# Patient Record
Sex: FEMALE | Race: OTHER | NOT HISPANIC OR LATINO | URBAN - METROPOLITAN AREA
[De-identification: names, ages, dates, MRNs, and addresses within clinical notes are randomized per-mention and may not be internally consistent; named-entity substitution may affect disease eponyms.]

---

## 2017-01-27 PROBLEM — Z00.00 ENCOUNTER FOR PREVENTIVE HEALTH EXAMINATION: Status: ACTIVE | Noted: 2017-01-27

## 2017-01-30 ENCOUNTER — OUTPATIENT (OUTPATIENT)
Dept: OUTPATIENT SERVICES | Facility: HOSPITAL | Age: 32
LOS: 1 days | End: 2017-01-30
Payer: COMMERCIAL

## 2017-01-30 DIAGNOSIS — Z00.00 ENCOUNTER FOR GENERAL ADULT MEDICAL EXAMINATION WITHOUT ABNORMAL FINDINGS: ICD-10-CM

## 2017-02-10 ENCOUNTER — APPOINTMENT (OUTPATIENT)
Dept: HEART AND VASCULAR | Facility: CLINIC | Age: 32
End: 2017-02-10

## 2017-02-10 ENCOUNTER — LABORATORY RESULT (OUTPATIENT)
Age: 32
End: 2017-02-10

## 2017-02-10 VITALS — SYSTOLIC BLOOD PRESSURE: 112 MMHG | DIASTOLIC BLOOD PRESSURE: 97 MMHG | HEART RATE: 109 BPM

## 2017-02-10 VITALS
DIASTOLIC BLOOD PRESSURE: 60 MMHG | SYSTOLIC BLOOD PRESSURE: 112 MMHG | BODY MASS INDEX: 27.46 KG/M2 | WEIGHT: 155 LBS | HEART RATE: 114 BPM | HEIGHT: 63 IN

## 2017-02-10 VITALS — SYSTOLIC BLOOD PRESSURE: 112 MMHG | DIASTOLIC BLOOD PRESSURE: 62 MMHG | HEART RATE: 98 BPM

## 2017-02-10 DIAGNOSIS — Z78.9 OTHER SPECIFIED HEALTH STATUS: ICD-10-CM

## 2017-02-10 DIAGNOSIS — R00.0 TACHYCARDIA, UNSPECIFIED: ICD-10-CM

## 2017-02-10 DIAGNOSIS — R00.2 PALPITATIONS: ICD-10-CM

## 2017-02-15 LAB
ALBUMIN SERPL ELPH-MCNC: 3.1 G/DL
ALP BLD-CCNC: 85 U/L
ALT SERPL-CCNC: 14 U/L
ANION GAP SERPL CALC-SCNC: 12 MMOL/L
AST SERPL-CCNC: 18 U/L
BASOPHILS # BLD AUTO: 0.02 K/UL
BASOPHILS NFR BLD AUTO: 0.2 %
BILIRUB SERPL-MCNC: 0.2 MG/DL
BUN SERPL-MCNC: 7 MG/DL
CALCIUM SERPL-MCNC: 9.5 MG/DL
CHLORIDE SERPL-SCNC: 107 MMOL/L
CO2 SERPL-SCNC: 21 MMOL/L
CREAT SERPL-MCNC: 0.5 MG/DL
EOSINOPHIL # BLD AUTO: 0.14 K/UL
EOSINOPHIL NFR BLD AUTO: 1.2 %
FERRITIN SERPL-MCNC: 8.2 NG/ML
GLUCOSE SERPL-MCNC: 95 MG/DL
HBA1C MFR BLD HPLC: 5.7 %
HCT VFR BLD CALC: 30.2 %
HGB BLD-MCNC: 9.8 G/DL
IMM GRANULOCYTES NFR BLD AUTO: 0.2 %
IRON SATN MFR SERPL: 10 %
IRON SERPL-MCNC: 50 UG/DL
LYMPHOCYTES # BLD AUTO: 1.29 K/UL
LYMPHOCYTES NFR BLD AUTO: 10.7 %
MAN DIFF?: NORMAL
MCHC RBC-ENTMCNC: 27.8 PG
MCHC RBC-ENTMCNC: 32.5 GM/DL
MCV RBC AUTO: 85.6 FL
MONOCYTES # BLD AUTO: 1.2 K/UL
MONOCYTES NFR BLD AUTO: 10 %
NEUTROPHILS # BLD AUTO: 9.36 K/UL
NEUTROPHILS NFR BLD AUTO: 77.7 %
PLATELET # BLD AUTO: 205 K/UL
POTASSIUM SERPL-SCNC: 4.1 MMOL/L
PROT SERPL-MCNC: 6 G/DL
RBC # BLD: 3.53 M/UL
RBC # BLD: 3.53 M/UL
RBC # FLD: 13.4 %
RETICS # AUTO: 1.4 %
RETICS AGGREG/RBC NFR: 50.8 K/UL
SODIUM SERPL-SCNC: 140 MMOL/L
T3FREE SERPL-MCNC: 2.52 PG/ML
T4 FREE SERPL-MCNC: 0.8 NG/DL
TIBC SERPL-MCNC: 490 UG/DL
TSH SERPL-ACNC: 1.69 UIU/ML
UIBC SERPL-MCNC: 440 UG/DL
WBC # FLD AUTO: 12.03 K/UL

## 2017-04-28 ENCOUNTER — OUTPATIENT (OUTPATIENT)
Dept: OUTPATIENT SERVICES | Facility: HOSPITAL | Age: 32
LOS: 1 days | End: 2017-04-28
Payer: COMMERCIAL

## 2017-04-28 LAB
ALBUMIN SERPL ELPH-MCNC: 2.6 G/DL — LOW (ref 3.4–5)
ALP SERPL-CCNC: 248 U/L — HIGH (ref 40–120)
ALT FLD-CCNC: 18 U/L — SIGNIFICANT CHANGE UP (ref 12–42)
ANION GAP SERPL CALC-SCNC: 13 MMOL/L — SIGNIFICANT CHANGE UP (ref 9–16)
APPEARANCE UR: CLEAR — SIGNIFICANT CHANGE UP
APTT BLD: 28.5 SEC — SIGNIFICANT CHANGE UP (ref 27.5–37.4)
AST SERPL-CCNC: 20 U/L — SIGNIFICANT CHANGE UP (ref 15–37)
BASOPHILS NFR BLD AUTO: 0.1 % — SIGNIFICANT CHANGE UP (ref 0–2)
BILIRUB SERPL-MCNC: 0.3 MG/DL — SIGNIFICANT CHANGE UP (ref 0.2–1.2)
BILIRUB UR-MCNC: NEGATIVE — SIGNIFICANT CHANGE UP
BUN SERPL-MCNC: 7 MG/DL — SIGNIFICANT CHANGE UP (ref 7–23)
CALCIUM SERPL-MCNC: 9.3 MG/DL — SIGNIFICANT CHANGE UP (ref 8.5–10.5)
CHLORIDE SERPL-SCNC: 105 MMOL/L — SIGNIFICANT CHANGE UP (ref 96–108)
CO2 SERPL-SCNC: 20 MMOL/L — LOW (ref 22–31)
COLOR SPEC: YELLOW — SIGNIFICANT CHANGE UP
CREAT SERPL-MCNC: 0.51 MG/DL — SIGNIFICANT CHANGE UP (ref 0.5–1.3)
DIFF PNL FLD: (no result)
EOSINOPHIL NFR BLD AUTO: 1.2 % — SIGNIFICANT CHANGE UP (ref 0–6)
FIBRINOGEN PPP-MCNC: 406 MG/DL — SIGNIFICANT CHANGE UP (ref 258–438)
GLUCOSE SERPL-MCNC: 76 MG/DL — SIGNIFICANT CHANGE UP (ref 70–99)
GLUCOSE UR QL: NEGATIVE — SIGNIFICANT CHANGE UP
HCT VFR BLD CALC: 33.5 % — LOW (ref 34.5–45)
HGB BLD-MCNC: 11 G/DL — LOW (ref 11.5–15.5)
INR BLD: 0.96 — SIGNIFICANT CHANGE UP (ref 0.88–1.16)
KETONES UR-MCNC: 40 MG/DL
LDH SERPL L TO P-CCNC: 197 U/L — SIGNIFICANT CHANGE UP (ref 84–246)
LEUKOCYTE ESTERASE UR-ACNC: NEGATIVE — SIGNIFICANT CHANGE UP
LYMPHOCYTES # BLD AUTO: 16.8 % — SIGNIFICANT CHANGE UP (ref 13–44)
MCHC RBC-ENTMCNC: 27 PG — SIGNIFICANT CHANGE UP (ref 27–34)
MCHC RBC-ENTMCNC: 32.8 G/DL — SIGNIFICANT CHANGE UP (ref 32–36)
MCV RBC AUTO: 82.1 FL — SIGNIFICANT CHANGE UP (ref 80–100)
MONOCYTES NFR BLD AUTO: 8.7 % — SIGNIFICANT CHANGE UP (ref 2–14)
NEUTROPHILS NFR BLD AUTO: 73.2 % — SIGNIFICANT CHANGE UP (ref 43–77)
NITRITE UR-MCNC: NEGATIVE — SIGNIFICANT CHANGE UP
PH UR: 6.5 — SIGNIFICANT CHANGE UP (ref 5–8)
PLATELET # BLD AUTO: 202 K/UL — SIGNIFICANT CHANGE UP (ref 150–400)
POTASSIUM SERPL-MCNC: 3.6 MMOL/L — SIGNIFICANT CHANGE UP (ref 3.5–5.3)
POTASSIUM SERPL-SCNC: 3.6 MMOL/L — SIGNIFICANT CHANGE UP (ref 3.5–5.3)
PROT SERPL-MCNC: 6.5 G/DL — SIGNIFICANT CHANGE UP (ref 6.4–8.2)
PROT UR-MCNC: NEGATIVE MG/DL — SIGNIFICANT CHANGE UP
PROTHROM AB SERPL-ACNC: 10.6 SEC — SIGNIFICANT CHANGE UP (ref 9.8–12.7)
RBC # BLD: 4.08 M/UL — SIGNIFICANT CHANGE UP (ref 3.8–5.2)
RBC # FLD: 15.3 % — SIGNIFICANT CHANGE UP (ref 10.3–16.9)
SODIUM SERPL-SCNC: 138 MMOL/L — SIGNIFICANT CHANGE UP (ref 135–145)
SP GR SPEC: 1.01 — SIGNIFICANT CHANGE UP (ref 1–1.03)
URATE SERPL-MCNC: 4 MG/DL — SIGNIFICANT CHANGE UP (ref 2.6–6)
UROBILINOGEN FLD QL: 0.2 E.U./DL — SIGNIFICANT CHANGE UP
WBC # BLD: 10.4 K/UL — SIGNIFICANT CHANGE UP (ref 3.8–10.5)
WBC # FLD AUTO: 10.4 K/UL — SIGNIFICANT CHANGE UP (ref 3.8–10.5)

## 2017-04-28 PROCEDURE — 81001 URINALYSIS AUTO W/SCOPE: CPT

## 2017-04-28 PROCEDURE — 85384 FIBRINOGEN ACTIVITY: CPT

## 2017-04-28 PROCEDURE — 80053 COMPREHEN METABOLIC PANEL: CPT

## 2017-04-28 PROCEDURE — 84550 ASSAY OF BLOOD/URIC ACID: CPT

## 2017-04-28 PROCEDURE — 85730 THROMBOPLASTIN TIME PARTIAL: CPT

## 2017-04-28 PROCEDURE — 83615 LACTATE (LD) (LDH) ENZYME: CPT

## 2017-04-28 PROCEDURE — 99214 OFFICE O/P EST MOD 30 MIN: CPT

## 2017-04-28 PROCEDURE — 85610 PROTHROMBIN TIME: CPT

## 2017-04-28 PROCEDURE — 85025 COMPLETE CBC W/AUTO DIFF WBC: CPT

## 2017-04-28 PROCEDURE — 36415 COLL VENOUS BLD VENIPUNCTURE: CPT

## 2017-05-01 ENCOUNTER — OUTPATIENT (OUTPATIENT)
Dept: OUTPATIENT SERVICES | Facility: HOSPITAL | Age: 32
LOS: 1 days | End: 2017-05-01
Payer: COMMERCIAL

## 2017-05-01 PROCEDURE — 99214 OFFICE O/P EST MOD 30 MIN: CPT

## 2017-05-02 ENCOUNTER — RESULT REVIEW (OUTPATIENT)
Age: 32
End: 2017-05-02

## 2017-05-02 ENCOUNTER — INPATIENT (INPATIENT)
Facility: HOSPITAL | Age: 32
LOS: 7 days | Discharge: HOME | End: 2017-05-10
Attending: OBSTETRICS & GYNECOLOGY | Admitting: OBSTETRICS & GYNECOLOGY
Payer: COMMERCIAL

## 2017-05-02 VITALS — WEIGHT: 164.91 LBS | HEIGHT: 63 IN

## 2017-05-02 LAB
BLD GP AB SCN SERPL QL: NEGATIVE — SIGNIFICANT CHANGE UP
RH IG SCN BLD-IMP: NEGATIVE — SIGNIFICANT CHANGE UP

## 2017-05-02 RX ORDER — CITRIC ACID/SODIUM CITRATE 300-500 MG
30 SOLUTION, ORAL ORAL ONCE
Qty: 0 | Refills: 0 | Status: DISCONTINUED | OUTPATIENT
Start: 2017-05-02 | End: 2017-05-02

## 2017-05-02 RX ORDER — SODIUM CHLORIDE 9 MG/ML
1000 INJECTION, SOLUTION INTRAVENOUS ONCE
Qty: 0 | Refills: 0 | Status: COMPLETED | OUTPATIENT
Start: 2017-05-02 | End: 2017-05-02

## 2017-05-02 RX ORDER — IBUPROFEN 200 MG
600 TABLET ORAL EVERY 6 HOURS
Qty: 0 | Refills: 0 | Status: DISCONTINUED | OUTPATIENT
Start: 2017-05-02 | End: 2017-05-06

## 2017-05-02 RX ORDER — DIPHENHYDRAMINE HCL 50 MG
25 CAPSULE ORAL EVERY 6 HOURS
Qty: 0 | Refills: 0 | Status: DISCONTINUED | OUTPATIENT
Start: 2017-05-02 | End: 2017-05-10

## 2017-05-02 RX ORDER — SIMETHICONE 80 MG/1
80 TABLET, CHEWABLE ORAL EVERY 4 HOURS
Qty: 0 | Refills: 0 | Status: DISCONTINUED | OUTPATIENT
Start: 2017-05-02 | End: 2017-05-10

## 2017-05-02 RX ORDER — LANOLIN
1 OINTMENT (GRAM) TOPICAL
Qty: 0 | Refills: 0 | Status: DISCONTINUED | OUTPATIENT
Start: 2017-05-02 | End: 2017-05-10

## 2017-05-02 RX ORDER — METOCLOPRAMIDE HCL 10 MG
10 TABLET ORAL ONCE
Qty: 0 | Refills: 0 | Status: DISCONTINUED | OUTPATIENT
Start: 2017-05-02 | End: 2017-05-02

## 2017-05-02 RX ORDER — TETANUS TOXOID, REDUCED DIPHTHERIA TOXOID AND ACELLULAR PERTUSSIS VACCINE, ADSORBED 5; 2.5; 8; 8; 2.5 [IU]/.5ML; [IU]/.5ML; UG/.5ML; UG/.5ML; UG/.5ML
0.5 SUSPENSION INTRAMUSCULAR ONCE
Qty: 0 | Refills: 0 | Status: COMPLETED | OUTPATIENT
Start: 2017-05-02

## 2017-05-02 RX ORDER — FERROUS SULFATE 325(65) MG
325 TABLET ORAL DAILY
Qty: 0 | Refills: 0 | Status: DISCONTINUED | OUTPATIENT
Start: 2017-05-02 | End: 2017-05-10

## 2017-05-02 RX ORDER — OXYTOCIN 10 UNIT/ML
41.67 VIAL (ML) INJECTION
Qty: 20 | Refills: 0 | Status: DISCONTINUED | OUTPATIENT
Start: 2017-05-02 | End: 2017-05-10

## 2017-05-02 RX ORDER — ACETAMINOPHEN 500 MG
650 TABLET ORAL EVERY 6 HOURS
Qty: 0 | Refills: 0 | Status: DISCONTINUED | OUTPATIENT
Start: 2017-05-02 | End: 2017-05-10

## 2017-05-02 RX ORDER — GLYCERIN ADULT
1 SUPPOSITORY, RECTAL RECTAL AT BEDTIME
Qty: 0 | Refills: 0 | Status: DISCONTINUED | OUTPATIENT
Start: 2017-05-02 | End: 2017-05-10

## 2017-05-02 RX ORDER — SODIUM CHLORIDE 9 MG/ML
1000 INJECTION, SOLUTION INTRAVENOUS
Qty: 0 | Refills: 0 | Status: DISCONTINUED | OUTPATIENT
Start: 2017-05-02 | End: 2017-05-02

## 2017-05-02 RX ORDER — SODIUM CHLORIDE 9 MG/ML
1000 INJECTION, SOLUTION INTRAVENOUS
Qty: 0 | Refills: 0 | Status: DISCONTINUED | OUTPATIENT
Start: 2017-05-02 | End: 2017-05-10

## 2017-05-02 RX ORDER — DOCUSATE SODIUM 100 MG
100 CAPSULE ORAL
Qty: 0 | Refills: 0 | Status: DISCONTINUED | OUTPATIENT
Start: 2017-05-02 | End: 2017-05-10

## 2017-05-02 RX ORDER — SODIUM CHLORIDE 9 MG/ML
1000 INJECTION, SOLUTION INTRAVENOUS
Qty: 0 | Refills: 0 | Status: DISCONTINUED | OUTPATIENT
Start: 2017-05-02 | End: 2017-05-03

## 2017-05-02 RX ORDER — OXYTOCIN 10 UNIT/ML
333.33 VIAL (ML) INJECTION
Qty: 20 | Refills: 0 | Status: DISCONTINUED | OUTPATIENT
Start: 2017-05-02 | End: 2017-05-10

## 2017-05-02 RX ORDER — HEPARIN SODIUM 5000 [USP'U]/ML
5000 INJECTION INTRAVENOUS; SUBCUTANEOUS EVERY 12 HOURS
Qty: 0 | Refills: 0 | Status: DISCONTINUED | OUTPATIENT
Start: 2017-05-03 | End: 2017-05-10

## 2017-05-02 RX ADMIN — SODIUM CHLORIDE 2000 MILLILITER(S): 9 INJECTION, SOLUTION INTRAVENOUS at 14:30

## 2017-05-02 RX ADMIN — Medication 30 MILLILITER(S): at 16:30

## 2017-05-02 RX ADMIN — SODIUM CHLORIDE 125 MILLILITER(S): 9 INJECTION, SOLUTION INTRAVENOUS at 15:11

## 2017-05-03 LAB
HCT VFR BLD CALC: 31.3 % — LOW (ref 34.5–45)
HGB BLD-MCNC: 10.3 G/DL — LOW (ref 11.5–15.5)
MCHC RBC-ENTMCNC: 27.6 PG — SIGNIFICANT CHANGE UP (ref 27–34)
MCHC RBC-ENTMCNC: 32.9 G/DL — SIGNIFICANT CHANGE UP (ref 32–36)
MCV RBC AUTO: 83.9 FL — SIGNIFICANT CHANGE UP (ref 80–100)
PLATELET # BLD AUTO: 179 K/UL — SIGNIFICANT CHANGE UP (ref 150–400)
RBC # BLD: 3.73 M/UL — LOW (ref 3.8–5.2)
RBC # FLD: 16 % — SIGNIFICANT CHANGE UP (ref 10.3–16.9)
WBC # BLD: 17.1 K/UL — HIGH (ref 3.8–10.5)
WBC # FLD AUTO: 17.1 K/UL — HIGH (ref 3.8–10.5)

## 2017-05-03 RX ORDER — ONDANSETRON 8 MG/1
4 TABLET, FILM COATED ORAL ONCE
Qty: 0 | Refills: 0 | Status: COMPLETED | OUTPATIENT
Start: 2017-05-03 | End: 2017-05-03

## 2017-05-03 RX ADMIN — ONDANSETRON 4 MILLIGRAM(S): 8 TABLET, FILM COATED ORAL at 00:10

## 2017-05-03 RX ADMIN — Medication 600 MILLIGRAM(S): at 08:43

## 2017-05-03 RX ADMIN — Medication 600 MILLIGRAM(S): at 07:37

## 2017-05-03 RX ADMIN — Medication 600 MILLIGRAM(S): at 17:31

## 2017-05-03 RX ADMIN — HEPARIN SODIUM 5000 UNIT(S): 5000 INJECTION INTRAVENOUS; SUBCUTANEOUS at 07:31

## 2017-05-03 RX ADMIN — SODIUM CHLORIDE 125 MILLILITER(S): 9 INJECTION, SOLUTION INTRAVENOUS at 09:57

## 2017-05-03 RX ADMIN — HEPARIN SODIUM 5000 UNIT(S): 5000 INJECTION INTRAVENOUS; SUBCUTANEOUS at 19:44

## 2017-05-03 RX ADMIN — Medication 600 MILLIGRAM(S): at 18:37

## 2017-05-03 RX ADMIN — Medication 100 MILLIGRAM(S): at 22:38

## 2017-05-03 NOTE — PROGRESS NOTE ADULT - SUBJECTIVE AND OBJECTIVE BOX
Patient evaluated at bedside.   She reports pain is well controlled with Duromorph.  She denies headache, dizziness, chest pain, palpitations, shortness of breath, nausea, vomiting or heavy vaginal bleeding.  She has not been OOB overnight.  Rios still in place.  Not passing gas and is breastfeeding.    PHYSICAL EXAM:    GA: NAD, A+0 x 3  CV: RRR  Pulm: CTA BL  Breasts: soft, nontender, no palpable masses  Abd: ( + ) BS, soft, nontender, nondistended, no rebound or guarding,   Incision: clean, dry and intact; steri-strips in place  Uterus: Fundus midline; firm at 4 fb below umbilicus  : lochia WNL  Rios: in place; draining clear yellow urine.  Extremities: no swelling or calf tenderness, reflexes +2 bilaterally                        10.3   17.1  )-----------( 179      ( 03 May 2017 06:31 )             31.3     Assessment:    POD # 1 s/p  delivery    Plan:    Diet: clear liquids until flatus  Labs: CBC noted  Pain meds: Percocet and motrin po today  Activity: OOB  Disposition: Will observe for discharge home POD 3 or 4

## 2017-05-04 LAB — T PALLIDUM AB TITR SER: NEGATIVE — SIGNIFICANT CHANGE UP

## 2017-05-04 RX ORDER — TETANUS TOXOID, REDUCED DIPHTHERIA TOXOID AND ACELLULAR PERTUSSIS VACCINE, ADSORBED 5; 2.5; 8; 8; 2.5 [IU]/.5ML; [IU]/.5ML; UG/.5ML; UG/.5ML; UG/.5ML
0.5 SUSPENSION INTRAMUSCULAR ONCE
Qty: 0 | Refills: 0 | Status: COMPLETED | OUTPATIENT
Start: 2017-05-04 | End: 2017-05-04

## 2017-05-04 RX ADMIN — Medication 600 MILLIGRAM(S): at 19:06

## 2017-05-04 RX ADMIN — SIMETHICONE 80 MILLIGRAM(S): 80 TABLET, CHEWABLE ORAL at 20:50

## 2017-05-04 RX ADMIN — Medication 1 TABLET(S): at 12:42

## 2017-05-04 RX ADMIN — HEPARIN SODIUM 5000 UNIT(S): 5000 INJECTION INTRAVENOUS; SUBCUTANEOUS at 19:06

## 2017-05-04 RX ADMIN — Medication 325 MILLIGRAM(S): at 12:42

## 2017-05-04 RX ADMIN — Medication 100 MILLIGRAM(S): at 12:42

## 2017-05-04 RX ADMIN — Medication 600 MILLIGRAM(S): at 01:45

## 2017-05-04 RX ADMIN — Medication 600 MILLIGRAM(S): at 13:00

## 2017-05-04 RX ADMIN — Medication 600 MILLIGRAM(S): at 19:30

## 2017-05-04 RX ADMIN — HEPARIN SODIUM 5000 UNIT(S): 5000 INJECTION INTRAVENOUS; SUBCUTANEOUS at 07:06

## 2017-05-04 RX ADMIN — TETANUS TOXOID, REDUCED DIPHTHERIA TOXOID AND ACELLULAR PERTUSSIS VACCINE, ADSORBED 0.5 MILLILITER(S): 5; 2.5; 8; 8; 2.5 SUSPENSION INTRAMUSCULAR at 20:16

## 2017-05-04 RX ADMIN — Medication 600 MILLIGRAM(S): at 12:42

## 2017-05-04 RX ADMIN — Medication 600 MILLIGRAM(S): at 02:30

## 2017-05-04 NOTE — PROGRESS NOTE ADULT - SUBJECTIVE AND OBJECTIVE BOX
Patient evaluated at bedside.   She reports pain is well controlled with motrin and percocet  She denies headache, dizziness, chest pain, palpitations, shortness of breathe, nausea, vomiting or heavy vaginal bleeding.  She has been ambulating without assistance, voiding spontaneously, not yet passing passing gas, tolerating clear liquid diet and is breastfeeding.    Physical Exam:  Vital Signs Last 24 Hrs  T(C): 36.5, Max: 36.7 (05-03 @ 14:00)  T(F): 97.7, Max: 98 (05-03 @ 14:00)  HR: 79 (79 - 92)  BP: 125/83 (115/56 - 128/89)  BP(mean): --  RR: 16 (16 - 18)  SpO2: 98% (98% - 99%)    GA: NAD, A+0 x 3  CV: RRR  Pulm: CTAB  Breasts: soft, nontender, no palpable masses  Abd: + BS, soft, nontender, nondistended, no rebound or guarding, incision clean, dry and intact, uterus firm at midline,2  fb below umbilicus  : lochia WNL  Rios: removed  Extremities: no swelling or calf tenderness, reflexes +2 bilaterally                            10.3   17.1  )-----------( 179      ( 03 May 2017 06:31 )             31.3

## 2017-05-05 ENCOUNTER — TRANSCRIPTION ENCOUNTER (OUTPATIENT)
Age: 32
End: 2017-05-05

## 2017-05-05 DIAGNOSIS — Z3A.00 WEEKS OF GESTATION OF PREGNANCY NOT SPECIFIED: ICD-10-CM

## 2017-05-05 DIAGNOSIS — O26.899 OTHER SPECIFIED PREGNANCY RELATED CONDITIONS, UNSPECIFIED TRIMESTER: ICD-10-CM

## 2017-05-05 LAB — SURGICAL PATHOLOGY STUDY: SIGNIFICANT CHANGE UP

## 2017-05-05 RX ADMIN — Medication 600 MILLIGRAM(S): at 06:26

## 2017-05-05 RX ADMIN — Medication 600 MILLIGRAM(S): at 05:24

## 2017-05-05 RX ADMIN — Medication 600 MILLIGRAM(S): at 12:43

## 2017-05-05 RX ADMIN — HEPARIN SODIUM 5000 UNIT(S): 5000 INJECTION INTRAVENOUS; SUBCUTANEOUS at 05:25

## 2017-05-05 RX ADMIN — Medication 100 MILLIGRAM(S): at 12:40

## 2017-05-05 RX ADMIN — Medication 600 MILLIGRAM(S): at 21:50

## 2017-05-05 RX ADMIN — Medication 1 APPLICATION(S): at 12:40

## 2017-05-05 RX ADMIN — Medication 1 TABLET(S): at 12:39

## 2017-05-05 RX ADMIN — HEPARIN SODIUM 5000 UNIT(S): 5000 INJECTION INTRAVENOUS; SUBCUTANEOUS at 19:29

## 2017-05-05 RX ADMIN — SIMETHICONE 80 MILLIGRAM(S): 80 TABLET, CHEWABLE ORAL at 19:29

## 2017-05-05 RX ADMIN — SIMETHICONE 80 MILLIGRAM(S): 80 TABLET, CHEWABLE ORAL at 05:24

## 2017-05-05 RX ADMIN — Medication 325 MILLIGRAM(S): at 12:39

## 2017-05-05 RX ADMIN — Medication 600 MILLIGRAM(S): at 12:30

## 2017-05-05 RX ADMIN — Medication 600 MILLIGRAM(S): at 20:56

## 2017-05-05 NOTE — DISCHARGE NOTE OB - CARE PLAN
Goal:	healthy recovery  Instructions for follow-up, activity and diet:	rtn to office 2 wk post partum Principal Discharge DX:	Postpartum state  Goal:	healthy recovery  Instructions for follow-up, activity and diet:	F/u with Dr. Contreras tomorrow by phone, early next week in office. Follow up with Dr. Wagoner in 1 week. Call to schedule these appointments. Check your bp twice a day. If systolic bp >160 or diastolic >110, you have a headache not relieved by Tylenol, vision changes, or upper abdominal pain, go to your nearest emergency room. Continue labetalol 300 mg three times a day and nifedipine (Procardia XL) 60 mg once a day. Motrin or Percocet as needed for pain. Pelvic rest--nothing in vagina for 6 weeks, including no baths.  Secondary Diagnosis:	Pre-eclampsia, unspecified trimester

## 2017-05-05 NOTE — PROGRESS NOTE ADULT - SUBJECTIVE AND OBJECTIVE BOX
Patient evaluated at bedside.   She reports pain is well controlled with percocet and motrin  She denies headache, dizziness, chest pain, palpitations, shortness of breathe, nausea, vomiting or heavy vaginal bleeding.  She has been ambulating without assistance, voiding spontaneously, passing gas, tolerating regular diet and is breastfeeding.    Physical Exam:  Vital Signs Last 24 Hrs  T(C): 36.7, Max: 36.9 (05-04 @ 21:19)  T(F): 98, Max: 98.5 (05-04 @ 21:19)  HR: 62 (62 - 88)  BP: 107/69 (107/69 - 135/72)  BP(mean): --  RR: 18 (14 - 18)  SpO2: 99% (99% - 99%)    GA: NAD, A+0 x 3  CV: RRR  Pulm: CTAB  Breasts: soft, nontender, no palpable masses  Abd: + BS, soft, nontender, nondistended, no rebound or guarding, incision clean, dry and intact, uterus firm at midline at umbilicus  : lochia WNL  Rios: removed on post-op day #1. patietn voiding freely  Extremities: no swelling or calf tenderness, reflexes +2 bilaterally

## 2017-05-05 NOTE — DISCHARGE NOTE OB - PATIENT PORTAL LINK FT
“You can access the [facility] Patient Portal, offered by [facility], by registering with the following website: [email or web address]”

## 2017-05-05 NOTE — DISCHARGE NOTE OB - HOSPITAL COURSE
cs at term cs at term, c/b preeclampsia, s/p magnesium. BP stable at discharge on labetalol and nifedipine. Nephrology consulted during inpatient stay, will f/u with them outpatient as well.

## 2017-05-05 NOTE — DISCHARGE NOTE OB - MEDICATION SUMMARY - MEDICATIONS TO STOP TAKING
I will STOP taking the medications listed below when I get home from the hospital:    Nifedical XL 30 mg oral tablet, extended release  -- 1 tab(s) by mouth 2 times a day  -- Avoid grapefruit and grapefruit juice while taking this medication.  It is very important that you take or use this exactly as directed.  Do not skip doses or discontinue unless directed by your doctor.  Some non-prescription drugs may aggravate your condition.  Read all labels carefully.  If a warning appears, check with your doctor before taking.  Swallow whole.  Do not crush.

## 2017-05-05 NOTE — DISCHARGE NOTE OB - PLAN OF CARE
healthy recovery rtn to office 2 wk post partum F/u with Dr. Contreras tomorrow by phone, early next week in office. Follow up with Dr. Wagoner in 1 week. Call to schedule these appointments. Check your bp twice a day. If systolic bp >160 or diastolic >110, you have a headache not relieved by Tylenol, vision changes, or upper abdominal pain, go to your nearest emergency room. Continue labetalol 300 mg three times a day and nifedipine (Procardia XL) 60 mg once a day. Motrin or Percocet as needed for pain. Pelvic rest--nothing in vagina for 6 weeks, including no baths.

## 2017-05-05 NOTE — DISCHARGE NOTE OB - MEDICATION SUMMARY - MEDICATIONS TO TAKE
I will START or STAY ON the medications listed below when I get home from the hospital:    Percocet 5/325 oral tablet  -- 1-2 tab(s) by mouth every 4-6 hours as needed for severe pain MDD:no more than 12 tabs per day  -- Caution federal law prohibits the transfer of this drug to any person other  than the person for whom it was prescribed.  May cause drowsiness.  Alcohol may intensify this effect.  Use care when operating dangerous machinery.  This prescription cannot be refilled.  This product contains acetaminophen.  Do not use  with any other product containing acetaminophen to prevent possible liver damage.  Using more of this medication than prescribed may cause serious breathing problems.    -- Indication: For Postpartum state    labetalol 300 mg oral tablet  -- 1 tab(s) by mouth every 8 hours  -- Indication: For Pre-eclampsia, unspecified trimester    NIFEdipine 60 mg oral tablet, extended release  -- 1 tab(s) by mouth once a day  -- Indication: For Postpartum state    NIFEdipine 60 mg oral tablet, extended release  -- 1 tab(s) by mouth once a day  -- Indication: For Postpartum state

## 2017-05-05 NOTE — DISCHARGE NOTE OB - CARE PROVIDER_API CALL
Heidy Wagoner), Obstetrics and Gynecology  [address] [address]  Phone: [phone]  Fax: [phone]

## 2017-05-06 DIAGNOSIS — O14.90 UNSPECIFIED PRE-ECLAMPSIA, UNSPECIFIED TRIMESTER: ICD-10-CM

## 2017-05-06 LAB
ALBUMIN SERPL ELPH-MCNC: 2.4 G/DL — LOW (ref 3.4–5)
ALBUMIN SERPL ELPH-MCNC: 2.6 G/DL — LOW (ref 3.4–5)
ALP SERPL-CCNC: 186 U/L — HIGH (ref 40–120)
ALP SERPL-CCNC: 201 U/L — HIGH (ref 40–120)
ALT FLD-CCNC: 27 U/L — SIGNIFICANT CHANGE UP (ref 12–42)
ALT FLD-CCNC: 66 U/L — HIGH (ref 12–42)
ANION GAP SERPL CALC-SCNC: 8 MMOL/L — LOW (ref 9–16)
ANION GAP SERPL CALC-SCNC: 8 MMOL/L — LOW (ref 9–16)
APPEARANCE UR: CLEAR — SIGNIFICANT CHANGE UP
APTT BLD: 30.7 SEC — SIGNIFICANT CHANGE UP (ref 27.5–37.4)
AST SERPL-CCNC: 37 U/L — SIGNIFICANT CHANGE UP (ref 15–37)
AST SERPL-CCNC: 74 U/L — HIGH (ref 15–37)
BASOPHILS NFR BLD AUTO: 0.1 % — SIGNIFICANT CHANGE UP (ref 0–2)
BASOPHILS NFR BLD AUTO: 0.2 % — SIGNIFICANT CHANGE UP (ref 0–2)
BILIRUB SERPL-MCNC: 0.5 MG/DL — SIGNIFICANT CHANGE UP (ref 0.2–1.2)
BILIRUB SERPL-MCNC: 0.8 MG/DL — SIGNIFICANT CHANGE UP (ref 0.2–1.2)
BILIRUB UR-MCNC: NEGATIVE — SIGNIFICANT CHANGE UP
BUN SERPL-MCNC: 11 MG/DL — SIGNIFICANT CHANGE UP (ref 7–23)
BUN SERPL-MCNC: 11 MG/DL — SIGNIFICANT CHANGE UP (ref 7–23)
CALCIUM SERPL-MCNC: 7.4 MG/DL — LOW (ref 8.5–10.5)
CALCIUM SERPL-MCNC: 9 MG/DL — SIGNIFICANT CHANGE UP (ref 8.5–10.5)
CHLORIDE SERPL-SCNC: 104 MMOL/L — SIGNIFICANT CHANGE UP (ref 96–108)
CHLORIDE SERPL-SCNC: 106 MMOL/L — SIGNIFICANT CHANGE UP (ref 96–108)
CO2 SERPL-SCNC: 26 MMOL/L — SIGNIFICANT CHANGE UP (ref 22–31)
CO2 SERPL-SCNC: 26 MMOL/L — SIGNIFICANT CHANGE UP (ref 22–31)
COLOR SPEC: YELLOW — SIGNIFICANT CHANGE UP
CREAT SERPL-MCNC: 0.57 MG/DL — SIGNIFICANT CHANGE UP (ref 0.5–1.3)
CREAT SERPL-MCNC: 0.62 MG/DL — SIGNIFICANT CHANGE UP (ref 0.5–1.3)
DIFF PNL FLD: NEGATIVE — SIGNIFICANT CHANGE UP
EOSINOPHIL NFR BLD AUTO: 2.8 % — SIGNIFICANT CHANGE UP (ref 0–6)
EOSINOPHIL NFR BLD AUTO: 2.9 % — SIGNIFICANT CHANGE UP (ref 0–6)
FIBRINOGEN PPP-MCNC: 375 MG/DL — SIGNIFICANT CHANGE UP (ref 258–438)
GLUCOSE SERPL-MCNC: 77 MG/DL — SIGNIFICANT CHANGE UP (ref 70–99)
GLUCOSE SERPL-MCNC: 78 MG/DL — SIGNIFICANT CHANGE UP (ref 70–99)
GLUCOSE UR QL: NEGATIVE — SIGNIFICANT CHANGE UP
HCT VFR BLD CALC: 28.6 % — LOW (ref 34.5–45)
HCT VFR BLD CALC: 29.8 % — LOW (ref 34.5–45)
HGB BLD-MCNC: 9.5 G/DL — LOW (ref 11.5–15.5)
HGB BLD-MCNC: 9.8 G/DL — LOW (ref 11.5–15.5)
INR BLD: 0.9 — SIGNIFICANT CHANGE UP (ref 0.88–1.16)
KETONES UR-MCNC: 15 MG/DL
LDH SERPL L TO P-CCNC: 242 U/L — SIGNIFICANT CHANGE UP (ref 84–246)
LDH SERPL L TO P-CCNC: 260 U/L — HIGH (ref 84–246)
LEUKOCYTE ESTERASE UR-ACNC: NEGATIVE — SIGNIFICANT CHANGE UP
LYMPHOCYTES # BLD AUTO: 11.8 % — LOW (ref 13–44)
LYMPHOCYTES # BLD AUTO: 15.3 % — SIGNIFICANT CHANGE UP (ref 13–44)
MAGNESIUM SERPL-MCNC: 5 MG/DL — HIGH (ref 1.6–2.4)
MAGNESIUM SERPL-MCNC: 5.7 MG/DL — HIGH (ref 1.6–2.4)
MCHC RBC-ENTMCNC: 27.1 PG — SIGNIFICANT CHANGE UP (ref 27–34)
MCHC RBC-ENTMCNC: 27.1 PG — SIGNIFICANT CHANGE UP (ref 27–34)
MCHC RBC-ENTMCNC: 32.9 G/DL — SIGNIFICANT CHANGE UP (ref 32–36)
MCHC RBC-ENTMCNC: 33.2 G/DL — SIGNIFICANT CHANGE UP (ref 32–36)
MCV RBC AUTO: 81.7 FL — SIGNIFICANT CHANGE UP (ref 80–100)
MCV RBC AUTO: 82.5 FL — SIGNIFICANT CHANGE UP (ref 80–100)
MONOCYTES NFR BLD AUTO: 9 % — SIGNIFICANT CHANGE UP (ref 2–14)
MONOCYTES NFR BLD AUTO: 9.5 % — SIGNIFICANT CHANGE UP (ref 2–14)
NEUTROPHILS NFR BLD AUTO: 72.2 % — SIGNIFICANT CHANGE UP (ref 43–77)
NEUTROPHILS NFR BLD AUTO: 76.2 % — SIGNIFICANT CHANGE UP (ref 43–77)
NITRITE UR-MCNC: NEGATIVE — SIGNIFICANT CHANGE UP
PH UR: 6.5 — SIGNIFICANT CHANGE UP (ref 5–8)
PLATELET # BLD AUTO: 219 K/UL — SIGNIFICANT CHANGE UP (ref 150–400)
PLATELET # BLD AUTO: 222 K/UL — SIGNIFICANT CHANGE UP (ref 150–400)
POTASSIUM SERPL-MCNC: 3.8 MMOL/L — SIGNIFICANT CHANGE UP (ref 3.5–5.3)
POTASSIUM SERPL-MCNC: 3.8 MMOL/L — SIGNIFICANT CHANGE UP (ref 3.5–5.3)
POTASSIUM SERPL-SCNC: 3.8 MMOL/L — SIGNIFICANT CHANGE UP (ref 3.5–5.3)
POTASSIUM SERPL-SCNC: 3.8 MMOL/L — SIGNIFICANT CHANGE UP (ref 3.5–5.3)
PROT SERPL-MCNC: 6.1 G/DL — LOW (ref 6.4–8.2)
PROT SERPL-MCNC: 6.6 G/DL — SIGNIFICANT CHANGE UP (ref 6.4–8.2)
PROT UR-MCNC: NEGATIVE MG/DL — SIGNIFICANT CHANGE UP
PROTHROM AB SERPL-ACNC: 10 SEC — SIGNIFICANT CHANGE UP (ref 9.8–12.7)
RBC # BLD: 3.5 M/UL — LOW (ref 3.8–5.2)
RBC # BLD: 3.61 M/UL — LOW (ref 3.8–5.2)
RBC # FLD: 16 % — SIGNIFICANT CHANGE UP (ref 10.3–16.9)
RBC # FLD: 16.1 % — SIGNIFICANT CHANGE UP (ref 10.3–16.9)
SODIUM SERPL-SCNC: 138 MMOL/L — SIGNIFICANT CHANGE UP (ref 135–145)
SODIUM SERPL-SCNC: 140 MMOL/L — SIGNIFICANT CHANGE UP (ref 135–145)
SP GR SPEC: 1.01 — SIGNIFICANT CHANGE UP (ref 1–1.03)
URATE SERPL-MCNC: 4.3 MG/DL — SIGNIFICANT CHANGE UP (ref 2.6–6)
URATE SERPL-MCNC: 4.6 MG/DL — SIGNIFICANT CHANGE UP (ref 2.6–6)
UROBILINOGEN FLD QL: 0.2 E.U./DL — SIGNIFICANT CHANGE UP
WBC # BLD: 10.4 K/UL — SIGNIFICANT CHANGE UP (ref 3.8–10.5)
WBC # BLD: 10.4 K/UL — SIGNIFICANT CHANGE UP (ref 3.8–10.5)
WBC # FLD AUTO: 10.4 K/UL — SIGNIFICANT CHANGE UP (ref 3.8–10.5)
WBC # FLD AUTO: 10.4 K/UL — SIGNIFICANT CHANGE UP (ref 3.8–10.5)

## 2017-05-06 RX ORDER — NIFEDIPINE 30 MG
30 TABLET, EXTENDED RELEASE 24 HR ORAL DAILY
Qty: 0 | Refills: 0 | Status: DISCONTINUED | OUTPATIENT
Start: 2017-05-06 | End: 2017-05-08

## 2017-05-06 RX ORDER — NIFEDIPINE 30 MG
30 TABLET, EXTENDED RELEASE 24 HR ORAL DAILY
Qty: 0 | Refills: 0 | Status: DISCONTINUED | OUTPATIENT
Start: 2017-05-06 | End: 2017-05-06

## 2017-05-06 RX ORDER — ACETAMINOPHEN 500 MG
650 TABLET ORAL EVERY 6 HOURS
Qty: 0 | Refills: 0 | Status: DISCONTINUED | OUTPATIENT
Start: 2017-05-06 | End: 2017-05-10

## 2017-05-06 RX ORDER — HYDRALAZINE HCL 50 MG
5 TABLET ORAL ONCE
Qty: 0 | Refills: 0 | Status: DISCONTINUED | OUTPATIENT
Start: 2017-05-06 | End: 2017-05-10

## 2017-05-06 RX ORDER — MAGNESIUM SULFATE 500 MG/ML
2 VIAL (ML) INJECTION
Qty: 40 | Refills: 0 | Status: DISCONTINUED | OUTPATIENT
Start: 2017-05-06 | End: 2017-05-10

## 2017-05-06 RX ORDER — MAGNESIUM SULFATE 500 MG/ML
4 VIAL (ML) INJECTION ONCE
Qty: 0 | Refills: 0 | Status: COMPLETED | OUTPATIENT
Start: 2017-05-06 | End: 2017-05-06

## 2017-05-06 RX ORDER — SODIUM CHLORIDE 9 MG/ML
1000 INJECTION, SOLUTION INTRAVENOUS
Qty: 0 | Refills: 0 | Status: DISCONTINUED | OUTPATIENT
Start: 2017-05-06 | End: 2017-05-10

## 2017-05-06 RX ADMIN — Medication 650 MILLIGRAM(S): at 17:45

## 2017-05-06 RX ADMIN — Medication 30 MILLILITER(S): at 03:57

## 2017-05-06 RX ADMIN — Medication 300 GRAM(S): at 06:03

## 2017-05-06 RX ADMIN — HEPARIN SODIUM 5000 UNIT(S): 5000 INJECTION INTRAVENOUS; SUBCUTANEOUS at 19:18

## 2017-05-06 RX ADMIN — HEPARIN SODIUM 5000 UNIT(S): 5000 INJECTION INTRAVENOUS; SUBCUTANEOUS at 06:44

## 2017-05-06 RX ADMIN — SODIUM CHLORIDE 75 MILLILITER(S): 9 INJECTION, SOLUTION INTRAVENOUS at 05:56

## 2017-05-06 RX ADMIN — Medication 100 MILLIGRAM(S): at 17:46

## 2017-05-06 RX ADMIN — Medication 600 MILLIGRAM(S): at 04:30

## 2017-05-06 RX ADMIN — Medication 30 MILLIGRAM(S): at 21:23

## 2017-05-06 RX ADMIN — Medication 50 GM/HR: at 06:28

## 2017-05-06 RX ADMIN — Medication 325 MILLIGRAM(S): at 12:20

## 2017-05-06 RX ADMIN — Medication 1 TABLET(S): at 12:20

## 2017-05-06 RX ADMIN — SODIUM CHLORIDE 75 MILLILITER(S): 9 INJECTION, SOLUTION INTRAVENOUS at 19:00

## 2017-05-06 RX ADMIN — Medication 650 MILLIGRAM(S): at 18:30

## 2017-05-06 RX ADMIN — Medication 100 MILLIGRAM(S): at 12:20

## 2017-05-06 RX ADMIN — Medication 600 MILLIGRAM(S): at 03:53

## 2017-05-06 NOTE — PROGRESS NOTE ADULT - SUBJECTIVE AND OBJECTIVE BOX
MAGNESIUM CHECK:   Patient seen and evaluated at bedside.   Resting comfortably without complaints. Reflexes+2 in lower extremities.   Urine output: >200cc/hr   Continue magnesium at: 2g/hr  No s/sx of magnesium toxicity at this time

## 2017-05-06 NOTE — PROGRESS NOTE ADULT - SUBJECTIVE AND OBJECTIVE BOX
BP rechecked 20 min after 5 mg hydralazine IVP, /92. Patient feels better after maalox.     Vital Signs Last 24 Hrs  T(C): 36.5, Max: 36.8 (05-05 @ 21:39)  T(F): 97.7, Max: 98.2 (05-05 @ 21:39)  HR: 55 (50 - 66)  BP: 172/92 (110/68 - 195/95)  BP(mean): --  RR: 16 (14 - 18)  SpO2: 98% (97% - 99%)                          9.8    10.4  )-----------( 222      ( 06 May 2017 04:45 )             29.8       Pushed 10 mg IV hydralazine. Patient being transferred back to labor and delivery to start IV magnesium. Will recheck bp and start labetalol 200 TID if still elevated.   CBC back, platelets normal. other labs pending. Will follow up. Nephrology consult in the morning. Dr. Luis Armando gonzalez.

## 2017-05-06 NOTE — PROGRESS NOTE ADULT - SUBJECTIVE AND OBJECTIVE BOX
Called to bedside for bp of 167/82, repeat bp still in 160s. Patient reports "gas pain" at her epigastrium, I gave her Maalox and it was relieved. Denies HA, scotoma, difficulty breathing. Has not had elevated bp while here in the hospital but was worked up for preeclampsia several weeks ago with normal labs for a one time elevated bp.     Physical Exam:  Vital Signs Last 24 Hrs  T(C): 36.5, Max: 36.8 (05-05 @ 21:39)  T(F): 97.7, Max: 98.2 (05-05 @ 21:39)  HR: 55 (50 - 66)  BP: 164/82 (107/69 - 167/85)  BP(mean): --  RR: 16 (14 - 18)  SpO2: 98% (97% - 99%)      GA: NAD, A+0 x 3  CV: RRR  Pulm: Normal work of breathing  Breasts: soft, nontender, no palpable masses  Abd: + BS, soft, nontender, nondistended, no rebound or guarding, uterus firm at midline,  fb below umbilicus  Incision: well approximated, no erythema or discharge  : lochia WNL  Extremities: no swelling or calf tenderness Called to bedside for bp of 167/82, repeat bp still in 160s. Patient reports "gas pain" at her epigastrium, I gave her Maalox and it was relieved. Denies HA, scotoma, difficulty breathing. Has not had elevated bp while here in the hospital but was worked up for preeclampsia several weeks ago with normal labs for a one time elevated bp.     Physical Exam:  Vital Signs Last 24 Hrs  T(C): 36.5, Max: 36.8 (05-05 @ 21:39)  T(F): 97.7, Max: 98.2 (05-05 @ 21:39)  HR: 55 (50 - 66)  BP: 164/82 (107/69 - 167/85)  BP(mean): --  RR: 16 (14 - 18)  SpO2: 98% (97% - 99%)      Gen: NAD, AAOx3  Normal work of breathing  CV RRR  Lungs CTAB  Extremities Reflexes wnl, 2+, no edema

## 2017-05-06 NOTE — CONSULT NOTE ADULT - PROBLEM SELECTOR RECOMMENDATION 9
pt blood pressure well controlled on magnesium drip.   aware only to be given for 24 hrs.    given low pulse rate, recommend starting nifedipine 30 mg this evening with assumption that mag drip to be d/c in am on 5/7  goal bp 150/90

## 2017-05-06 NOTE — CONSULT NOTE ADULT - SUBJECTIVE AND OBJECTIVE BOX
pt is a 32 year old female,  post op  c section day 4, had bp 190s systolic this am, was treated with magnesium and bp currently in 110/ 80 with pulse rate in 60s. pt notes this is her first child, no personal h/o of htn, no family h/o of pre-eclampsia that she is aware of. she currently feels well, denies nausea vomitting or diarrhea/ no headaches or vision abnormalities.   pt resting in bed and in nad.         MEDICATIONS  (STANDING):  oxytocin Infusion 333.333milliUNIT(s)/Min IV Continuous <Continuous>  oxytocin Infusion 41.667milliUNIT(s)/Min IV Continuous <Continuous>  lactated ringers. 1000milliLiter(s) IV Continuous <Continuous>  oxytocin Infusion 41.667milliUNIT(s)/Min IV Continuous <Continuous>  ferrous    sulfate 325milliGRAM(s) Oral daily  prenatal multivitamin 1Tablet(s) Oral daily  heparin  Injectable 5000Unit(s) SubCutaneous every 12 hours  hydrALAZINE Injectable 5milliGRAM(s) IV Push once  magnesium sulfate Infusion 2Gm/Hr IV Continuous <Continuous>  lactated ringers. 1000milliLiter(s) IV Continuous <Continuous>    MEDICATIONS  (PRN):  acetaminophen   Tablet 650milliGRAM(s) Oral every 6 hours PRN For Temp greater than 38.5 C (101.3 F)  ibuprofen  Tablet 600milliGRAM(s) Oral every 6 hours PRN Mild pain or headache  oxyCODONE  5 mG/acetaminophen 325 mG 1Tablet(s) Oral every 3 hours PRN Moderate Pain  oxyCODONE  5 mG/acetaminophen 325 mG 2Tablet(s) Oral every 6 hours PRN Severe Pain  simethicone 80milliGRAM(s) Chew every 4 hours PRN Gas  diphenhydrAMINE   Capsule 25milliGRAM(s) Oral every 6 hours PRN Itching  glycerin Suppository - Adult 1Suppository(s) Rectal at bedtime PRN Constipation  docusate sodium 100milliGRAM(s) Oral two times a day PRN Stool Softening  lanolin Ointment 1Application(s) Topical every 3 hours PRN Sore Nipples  aluminum hydroxide/magnesium hydroxide/simethicone Suspension 30milliLiter(s) Oral every 4 hours PRN Dyspepsia      Allergies    No Known Allergies    Intolerances        SOCIAL HISTORY:    FAMILY HISTORY:      T(C): , Max: 36.8 ( @ 21:39)  T(F): , Max: 98.2 ( @ 21:39)  HR: 74  BP: 119/68  BP(mean): 92  RR: 18  SpO2: 98%  Wt(kg): --  I & Os for 24h ending  @ 07:00  =============================================  IN: 100 ml / OUT: 0 ml / NET: 100 ml    I & Os for current day (as of  @ 14:22)  =============================================  IN: 990 ml / OUT: 1700 ml / NET: -710 ml        PHYSICAL EXAM:  Constitutional: Well appearning.  No acute distress  ENMT: Moist mucous membrane.  No cyanosis.  Neck: Supple. No JVD.  Back: No CVA tenderness  Respiratory: Clear to auscultation   Cardiovascular: S1, S2.  Regular rate and rhythm.    Gastrointestinal: soft, non-tender, non-distended, normal bowel sounds  Extremities: Warm.  one plus  lower extremity edema.    Neurological: No focal deficits.  Skin: Warm. Dry.    Lymph Nodes:  No cervical lymph nodes.    Psychiatric: Normal affect.    ACCESS:     LABS:                        9.8    10.4  )-----------( 222      ( 06 May 2017 04:45 )             29.8         140  |  106  |  11  ----------------------------<  77  3.8   |  26  |  0.57    Ca    9.0      06 May 2017 04:45  Mg     5.0         TPro  6.6  /  Alb  2.6<L>  /  TBili  0.8  /  DBili  x   /  AST  37  /  ALT  27  /  AlkPhos  201<H>      Uric Acid, Serum: 4.3 mg/dL [2.6 - 6.0] ( @ 04:45)      Urinalysis Basic - ( 06 May 2017 08:47 )    Color: Yellow / Appearance: Clear / S.015 / pH: x  Gluc: x / Ketone: 15 mg/dL  / Bili: NEGATIVE / Urobili: 0.2 E.U./dL   Blood: x / Protein: NEGATIVE mg/dL / Nitrite: NEGATIVE   Leuk Esterase: NEGATIVE / RBC: x / WBC x   Sq Epi: x / Non Sq Epi: x / Bacteria: x            RADIOLOGY & ADDITIONAL STUDIES:

## 2017-05-07 LAB
ALBUMIN SERPL ELPH-MCNC: 2.5 G/DL — LOW (ref 3.4–5)
ALP SERPL-CCNC: 183 U/L — HIGH (ref 40–120)
ALT FLD-CCNC: 98 U/L — HIGH (ref 12–42)
ANION GAP SERPL CALC-SCNC: 9 MMOL/L — SIGNIFICANT CHANGE UP (ref 9–16)
AST SERPL-CCNC: 86 U/L — HIGH (ref 15–37)
BASOPHILS NFR BLD AUTO: 0.1 % — SIGNIFICANT CHANGE UP (ref 0–2)
BILIRUB SERPL-MCNC: 0.7 MG/DL — SIGNIFICANT CHANGE UP (ref 0.2–1.2)
BUN SERPL-MCNC: 8 MG/DL — SIGNIFICANT CHANGE UP (ref 7–23)
CALCIUM SERPL-MCNC: 7.5 MG/DL — LOW (ref 8.5–10.5)
CHLORIDE SERPL-SCNC: 103 MMOL/L — SIGNIFICANT CHANGE UP (ref 96–108)
CO2 SERPL-SCNC: 25 MMOL/L — SIGNIFICANT CHANGE UP (ref 22–31)
CREAT SERPL-MCNC: 0.55 MG/DL — SIGNIFICANT CHANGE UP (ref 0.5–1.3)
EOSINOPHIL NFR BLD AUTO: 3.6 % — SIGNIFICANT CHANGE UP (ref 0–6)
GLUCOSE SERPL-MCNC: 72 MG/DL — SIGNIFICANT CHANGE UP (ref 70–99)
HCT VFR BLD CALC: 30.6 % — LOW (ref 34.5–45)
HGB BLD-MCNC: 10.3 G/DL — LOW (ref 11.5–15.5)
LDH SERPL L TO P-CCNC: 275 U/L — HIGH (ref 84–246)
LYMPHOCYTES # BLD AUTO: 13.5 % — SIGNIFICANT CHANGE UP (ref 13–44)
MAGNESIUM SERPL-MCNC: 6.1 MG/DL — HIGH (ref 1.6–2.4)
MAGNESIUM SERPL-MCNC: 6.4 MG/DL — HIGH (ref 1.6–2.4)
MCHC RBC-ENTMCNC: 27.6 PG — SIGNIFICANT CHANGE UP (ref 27–34)
MCHC RBC-ENTMCNC: 33.7 G/DL — SIGNIFICANT CHANGE UP (ref 32–36)
MCV RBC AUTO: 82 FL — SIGNIFICANT CHANGE UP (ref 80–100)
MONOCYTES NFR BLD AUTO: 8.4 % — SIGNIFICANT CHANGE UP (ref 2–14)
NEUTROPHILS NFR BLD AUTO: 74.4 % — SIGNIFICANT CHANGE UP (ref 43–77)
PLATELET # BLD AUTO: 259 K/UL — SIGNIFICANT CHANGE UP (ref 150–400)
POTASSIUM SERPL-MCNC: 3.9 MMOL/L — SIGNIFICANT CHANGE UP (ref 3.5–5.3)
POTASSIUM SERPL-SCNC: 3.9 MMOL/L — SIGNIFICANT CHANGE UP (ref 3.5–5.3)
PROT SERPL-MCNC: 6.4 G/DL — SIGNIFICANT CHANGE UP (ref 6.4–8.2)
RBC # BLD: 3.73 M/UL — LOW (ref 3.8–5.2)
RBC # FLD: 15.5 % — SIGNIFICANT CHANGE UP (ref 10.3–16.9)
SODIUM SERPL-SCNC: 137 MMOL/L — SIGNIFICANT CHANGE UP (ref 135–145)
URATE SERPL-MCNC: 4.8 MG/DL — SIGNIFICANT CHANGE UP (ref 2.6–6)
WBC # BLD: 9.1 K/UL — SIGNIFICANT CHANGE UP (ref 3.8–10.5)
WBC # FLD AUTO: 9.1 K/UL — SIGNIFICANT CHANGE UP (ref 3.8–10.5)

## 2017-05-07 RX ADMIN — HEPARIN SODIUM 5000 UNIT(S): 5000 INJECTION INTRAVENOUS; SUBCUTANEOUS at 17:19

## 2017-05-07 RX ADMIN — Medication 325 MILLIGRAM(S): at 11:56

## 2017-05-07 RX ADMIN — Medication 50 GM/HR: at 02:00

## 2017-05-07 RX ADMIN — Medication 100 MILLIGRAM(S): at 11:56

## 2017-05-07 RX ADMIN — Medication 1 TABLET(S): at 11:57

## 2017-05-07 RX ADMIN — HEPARIN SODIUM 5000 UNIT(S): 5000 INJECTION INTRAVENOUS; SUBCUTANEOUS at 06:32

## 2017-05-07 RX ADMIN — Medication 30 MILLIGRAM(S): at 21:03

## 2017-05-07 RX ADMIN — Medication 100 MILLIGRAM(S): at 21:20

## 2017-05-07 RX ADMIN — Medication 1 APPLICATION(S): at 21:03

## 2017-05-07 NOTE — PROGRESS NOTE ADULT - SUBJECTIVE AND OBJECTIVE BOX
Patient evaluated at bedside. No acute events overnight. She reports pain is well controlled. Adequate urine output. Has tolerated PO w/o issue. Not yet attempted ambulation. Has been passing flatus. She denies headache, dizziness, chest pain, palpitations, shortness of breathe, RUQ/epigastric pain, nausea, vomiting or heavy vaginal bleeding.      Physical Exam:  Vital Signs Last 24 Hrs  T(C): 36.6, Max: 36.6 (05-06 @ 17:45)  T(F): 97.8, Max: 97.8 (05-06 @ 17:45)  HR: 80 (58 - 91)  BP: 132/70 (109/74 - 136/73)  BP(mean): --  RR: 18 (15 - 20)  SpO2: 98% (93% - 100%)    GA: NAD, A+0 x 3  CV: RRR  Pulm: CTAB  Abd: + BS, soft, nontender, nondistended, no rebound or guarding, uterus firm at midline, 2 fb below umbilicus  Incision clean, dry and intact  : lochia WNL  Extremities: no swelling or calf tenderness, +2 reflexes                             9.5    10.4  )-----------( 219      ( 06 May 2017 18:09 )             28.6     05-06    138  |  104  |  11  ----------------------------<  78  3.8   |  26  |  0.62    Ca    7.4<L>      06 May 2017 18:09  Mg     6.1     05-07    TPro  6.1<L>  /  Alb  2.4<L>  /  TBili  0.5  /  DBili  x   /  AST  74<H>  /  ALT  66<H>  /  AlkPhos  186<H>  05-06    Magnesium, Serum: 6.1 mg/dL (05-07 @ 00:20)  Magnesium, Serum: 5.7 mg/dL (05-06 @ 18:09)    PT/INR - ( 06 May 2017 18:09 )   PT: 10.0 sec;   INR: 0.90          PTT - ( 06 May 2017 18:09 )  PTT:30.7 sec

## 2017-05-07 NOTE — PROGRESS NOTE ADULT - SUBJECTIVE AND OBJECTIVE BOX
Patient evaluated at bedside. She has been ambulating without assistance, voiding spontaneously, passing gas, tolerating regular diet and is breastfeeding.  She reports pain is well controlled with percocet  She denies headache, dizziness, chest pain, palpitations, shortness of breathe, nausea, vomiting or heavy vaginal bleeding.  On Nifidipine for h/o elevated bp, s/p magnesium for late onset pec, elevated transamninases noted.  No toxic sxs, bp in normal range      Physical Exam:  Vital Signs Last 24 Hrs  T(C): 36.1, Max: 36.6 (05-06 @ 17:45)  T(F): 97, Max: 97.8 (05-06 @ 17:45)  HR: 76 (63 - 91)  BP: 126/76 (113/91 - 137/78)  BP(mean): 93 (93 - 93)  RR: 16 (16 - 19)  SpO2: 94% (94% - 98%)    Constitutional: Alert & Oriented x3, No acute distress, cooperative   Gastrointestinal: soft, NT, no rebound or guarding; uterus firm at midline,  2 fb below umbilicus  Incision: steristrips in place; area clean, dry and intact; no erythema or induration   :voiding  Extremities:  no calf tenderness                          10.3   9.1   )-----------( 259      ( 07 May 2017 06:40 )             30.6     05-07    137  |  103  |  8   ----------------------------<  72  3.9   |  25  |  0.55    Ca    7.5<L>      07 May 2017 06:40  Mg     6.4     05-07    TPro  6.4  /  Alb  2.5<L>  /  TBili  0.7  /  DBili  x   /  AST  86<H>  /  ALT  98<H>  /  AlkPhos  183<H>  05-07    Magnesium, Serum: 6.4 mg/dL (05-07 @ 06:40)    PT/INR - ( 06 May 2017 18:09 )   PT: 10.0 sec;   INR: 0.90          PTT - ( 06 May 2017 18:09 )  PTT:30.7 sec    A/P   32y  s/p c/s, POD #5, stable, recently off magnesium (this am) for late onset PEC, on nifedipine s/p hydralazine push x 2, bp now in normal range but elevated transaminases  -  Pain: PO  percocet  -  GI: tolerating regular diet  -  : voiding  -  DVT prophylaxis: ambulation, SCDs, SQH  -  Dispo: consider tomrrow am if stable bp and rpt labs (to be done in am)     nephrology consult apprecaited

## 2017-05-07 NOTE — PROGRESS NOTE ADULT - SUBJECTIVE AND OBJECTIVE BOX
Dr. Mynor Elizondo  Renal Fellow  Beeper # [phone]        Patient seen and examined at bedside.   bp between 125-135 /70-80  currently off magnesium drip  denies headache, nausea vomitting or diarrhea.     oxytocin Infusion 333.333milliUNIT(s)/Min <Continuous>  oxytocin Infusion 41.667milliUNIT(s)/Min <Continuous>  lactated ringers. 1000milliLiter(s) <Continuous>  acetaminophen   Tablet 650milliGRAM(s) every 6 hours PRN  oxyCODONE  5 mG/acetaminophen 325 mG 1Tablet(s) every 3 hours PRN  oxyCODONE  5 mG/acetaminophen 325 mG 2Tablet(s) every 6 hours PRN  simethicone 80milliGRAM(s) every 4 hours PRN  diphenhydrAMINE   Capsule 25milliGRAM(s) every 6 hours PRN  oxytocin Infusion 41.667milliUNIT(s)/Min <Continuous>  glycerin Suppository - Adult 1Suppository(s) at bedtime PRN  docusate sodium 100milliGRAM(s) two times a day PRN  lanolin Ointment 1Application(s) every 3 hours PRN  ferrous    sulfate 325milliGRAM(s) daily  prenatal multivitamin 1Tablet(s) daily  heparin  Injectable 5000Unit(s) every 12 hours  aluminum hydroxide/magnesium hydroxide/simethicone Suspension 30milliLiter(s) every 4 hours PRN  hydrALAZINE Injectable 5milliGRAM(s) once  magnesium sulfate Infusion 2Gm/Hr <Continuous>  lactated ringers. 1000milliLiter(s) <Continuous>  acetaminophen   Tablet. 650milliGRAM(s) every 6 hours PRN  NIFEdipine XL 30milliGRAM(s) daily      Allergies    No Known Allergies    Intolerances        T(C): , Max: 36.6 ( @ 17:45)  T(F): , Max: 97.8 (- @ 17:45)  HR: 86  BP: 135/80  BP(mean): 93  RR: 18  SpO2: 94%  Wt(kg): --  I & Os for 24h ending 05- @ 07:00  =============================================  IN:    lactated ringers.: 1000 ml    Oral Fluid: 720 ml    magnesium sulfate  Infusion: 650 ml    Total IN: 2370 ml  ---------------------------------------------  OUT:    Voided: 7200 ml    Total OUT: 7200 ml  ---------------------------------------------  Total NET: -4830 ml    I & Os for current day (as of  @ 10:43)  =============================================  IN:    Total IN: 0 ml  ---------------------------------------------  OUT:    Voided: 800 ml    Total OUT: 800 ml  ---------------------------------------------  Total NET: -800 ml        PHYSICAL EXAM:  Constitutional: Well appearning.  No acute distress  ENMT: Moist mucous membrane.  No cyanosis.  Neck: Supple. No JVD.  Back: No CVA tenderness  Respiratory: Clear to auscultation   Cardiovascular: S1, S2.  Regular rate and rhythm.    Gastrointestinal: soft, non-tender, non-distended, normal bowel sounds  Extremities: Warm.  No lower extremity edema.    Neurological: No focal deficits.  Skin: Warm. Dry.    Lymph Nodes:  No cervical lymph nodes.    Psychiatric: Normal affect.    ACCESS:     LABS:                        10.3   9.1   )-----------( 259      ( 07 May 2017 06:40 )             30.6         137  |  103  |  8   ----------------------------<  72  3.9   |  25  |  0.55    Ca    7.5<L>      07 May 2017 06:40  Mg     6.4         TPro  6.4  /  Alb  2.5<L>  /  TBili  0.7  /  DBili  x   /  AST  86<H>  /  ALT  98<H>  /  AlkPhos  183<H>      Uric Acid, Serum: 4.8 mg/dL [2.6 - 6.0] ( @ 06:40)  Uric Acid, Serum: 4.6 mg/dL [2.6 - 6.0] ( @ 18:09)    PT/INR - ( 06 May 2017 18:09 )   PT: 10.0 sec;   INR: 0.90          PTT - ( 06 May 2017 18:09 )  PTT:30.7 sec  Urinalysis Basic - ( 06 May 2017 08:47 )    Color: Yellow / Appearance: Clear / S.015 / pH: x  Gluc: x / Ketone: 15 mg/dL  / Bili: NEGATIVE / Urobili: 0.2 E.U./dL   Blood: x / Protein: NEGATIVE mg/dL / Nitrite: NEGATIVE   Leuk Esterase: NEGATIVE / RBC: x / WBC x   Sq Epi: x / Non Sq Epi: x / Bacteria: x            RADIOLOGY & ADDITIONAL STUDIES:

## 2017-05-08 LAB
ALBUMIN SERPL ELPH-MCNC: 2.4 G/DL — LOW (ref 3.4–5)
ALP SERPL-CCNC: 172 U/L — HIGH (ref 40–120)
ALT FLD-CCNC: 70 U/L — HIGH (ref 12–42)
ANION GAP SERPL CALC-SCNC: 8 MMOL/L — LOW (ref 9–16)
AST SERPL-CCNC: 39 U/L — HIGH (ref 15–37)
BASOPHILS NFR BLD AUTO: 0.2 % — SIGNIFICANT CHANGE UP (ref 0–2)
BILIRUB SERPL-MCNC: 0.4 MG/DL — SIGNIFICANT CHANGE UP (ref 0.2–1.2)
BUN SERPL-MCNC: 9 MG/DL — SIGNIFICANT CHANGE UP (ref 7–23)
CALCIUM SERPL-MCNC: 8.2 MG/DL — LOW (ref 8.5–10.5)
CHLORIDE SERPL-SCNC: 107 MMOL/L — SIGNIFICANT CHANGE UP (ref 96–108)
CO2 SERPL-SCNC: 26 MMOL/L — SIGNIFICANT CHANGE UP (ref 22–31)
CREAT SERPL-MCNC: 0.76 MG/DL — SIGNIFICANT CHANGE UP (ref 0.5–1.3)
EOSINOPHIL NFR BLD AUTO: 3.8 % — SIGNIFICANT CHANGE UP (ref 0–6)
GLUCOSE SERPL-MCNC: 66 MG/DL — LOW (ref 70–99)
HCT VFR BLD CALC: 30.2 % — LOW (ref 34.5–45)
HGB BLD-MCNC: 10 G/DL — LOW (ref 11.5–15.5)
LDH SERPL L TO P-CCNC: 243 U/L — SIGNIFICANT CHANGE UP (ref 84–246)
LYMPHOCYTES # BLD AUTO: 17.1 % — SIGNIFICANT CHANGE UP (ref 13–44)
MCHC RBC-ENTMCNC: 27.5 PG — SIGNIFICANT CHANGE UP (ref 27–34)
MCHC RBC-ENTMCNC: 33.1 G/DL — SIGNIFICANT CHANGE UP (ref 32–36)
MCV RBC AUTO: 83.2 FL — SIGNIFICANT CHANGE UP (ref 80–100)
MONOCYTES NFR BLD AUTO: 10.2 % — SIGNIFICANT CHANGE UP (ref 2–14)
NEUTROPHILS NFR BLD AUTO: 68.7 % — SIGNIFICANT CHANGE UP (ref 43–77)
PLATELET # BLD AUTO: 246 K/UL — SIGNIFICANT CHANGE UP (ref 150–400)
POTASSIUM SERPL-MCNC: 3.9 MMOL/L — SIGNIFICANT CHANGE UP (ref 3.5–5.3)
POTASSIUM SERPL-SCNC: 3.9 MMOL/L — SIGNIFICANT CHANGE UP (ref 3.5–5.3)
PROT SERPL-MCNC: 6.1 G/DL — LOW (ref 6.4–8.2)
RBC # BLD: 3.63 M/UL — LOW (ref 3.8–5.2)
RBC # FLD: 15.2 % — SIGNIFICANT CHANGE UP (ref 10.3–16.9)
SODIUM SERPL-SCNC: 141 MMOL/L — SIGNIFICANT CHANGE UP (ref 135–145)
URATE SERPL-MCNC: 4.7 MG/DL — SIGNIFICANT CHANGE UP (ref 2.6–6)
WBC # BLD: 10 K/UL — SIGNIFICANT CHANGE UP (ref 3.8–10.5)
WBC # FLD AUTO: 10 K/UL — SIGNIFICANT CHANGE UP (ref 3.8–10.5)

## 2017-05-08 RX ORDER — NIFEDIPINE 30 MG
60 TABLET, EXTENDED RELEASE 24 HR ORAL DAILY
Qty: 0 | Refills: 0 | Status: DISCONTINUED | OUTPATIENT
Start: 2017-05-08 | End: 2017-05-10

## 2017-05-08 RX ORDER — LABETALOL HCL 100 MG
200 TABLET ORAL EVERY 8 HOURS
Qty: 0 | Refills: 0 | Status: DISCONTINUED | OUTPATIENT
Start: 2017-05-08 | End: 2017-05-08

## 2017-05-08 RX ORDER — NIFEDIPINE 30 MG
30 TABLET, EXTENDED RELEASE 24 HR ORAL ONCE
Qty: 0 | Refills: 0 | Status: COMPLETED | OUTPATIENT
Start: 2017-05-08 | End: 2017-05-08

## 2017-05-08 RX ORDER — LABETALOL HCL 100 MG
100 TABLET ORAL ONCE
Qty: 0 | Refills: 0 | Status: COMPLETED | OUTPATIENT
Start: 2017-05-08 | End: 2017-05-08

## 2017-05-08 RX ORDER — LABETALOL HCL 100 MG
300 TABLET ORAL EVERY 8 HOURS
Qty: 0 | Refills: 0 | Status: DISCONTINUED | OUTPATIENT
Start: 2017-05-08 | End: 2017-05-10

## 2017-05-08 RX ADMIN — HEPARIN SODIUM 5000 UNIT(S): 5000 INJECTION INTRAVENOUS; SUBCUTANEOUS at 06:28

## 2017-05-08 RX ADMIN — Medication 30 MILLIGRAM(S): at 14:17

## 2017-05-08 RX ADMIN — Medication 325 MILLIGRAM(S): at 08:45

## 2017-05-08 RX ADMIN — Medication 100 MILLIGRAM(S): at 21:43

## 2017-05-08 RX ADMIN — Medication 200 MILLIGRAM(S): at 13:34

## 2017-05-08 RX ADMIN — Medication 200 MILLIGRAM(S): at 21:41

## 2017-05-08 RX ADMIN — Medication 200 MILLIGRAM(S): at 05:19

## 2017-05-08 RX ADMIN — Medication 30 MILLIGRAM(S): at 06:28

## 2017-05-08 RX ADMIN — Medication 100 MILLIGRAM(S): at 08:44

## 2017-05-08 RX ADMIN — HEPARIN SODIUM 5000 UNIT(S): 5000 INJECTION INTRAVENOUS; SUBCUTANEOUS at 17:54

## 2017-05-08 RX ADMIN — Medication 1 TABLET(S): at 08:45

## 2017-05-08 RX ADMIN — Medication 100 MILLIGRAM(S): at 22:13

## 2017-05-08 NOTE — DIETITIAN INITIAL EVALUATION ADULT. - PERTINENT LABORATORY DATA
H/H 10/30.2, Glu 66, Ca 8.2, Pro 6.1, Alb 2.4, Mg 6.4 H+H 10/30.2, Glu 66, Ca 8.2, Pro 6.1, Alb 2.4, Mg 6.4

## 2017-05-08 NOTE — PROVIDER CONTACT NOTE (CHANGE IN STATUS NOTIFICATION) - BACKGROUND
33 y/o , status post c/section day 6. Hx of preeclampsia, magnesium sulfate therapy dc'd @ 9:29 on .

## 2017-05-08 NOTE — PROGRESS NOTE ADULT - SUBJECTIVE AND OBJECTIVE BOX
SUBJECTIVE: Pt currently has no active complaints. Pertinent ROS is negative.    Vital Signs Last 12 Hrs  T(F): 98.3, Max: 98.6 (05-08 @ 00:00)  HR: 73 (55 - 73)  BP: 148/89 (148/81 - 167/91)  BP(mean): --  RR: 14 (14 - 16)  SpO2: 98% (96% - 98%)  Wt(kg): --  I&O's Summary    I & Os for current day (as of 08 May 2017 10:35)  =============================================  IN: 0 ml / OUT: 1500 ml / NET: -1500 ml      PHYSICAL EXAM:    Constitutional: NAD, AAOx3, comfortable in bed.   Respiratory: Normal rate, rhythm, depth, effort. CTAB. No w/r/r.   Cardiovascular: RRR, normal S1 and S2, no m/r/g.   Gastrointestinal: +BS, soft NTND.   Extremities: wwp, no edema.   Vascular: Pulses equal and strong throughout.   Neurological: Cranial nerves grossly intact, strength and sensation intact throughout.   Skin: No gross skin abnormalities.         LABS:                        10.0   10.0  )-----------( 246      ( 08 May 2017 05:45 )             30.2     05-08    141  |  107  |  9   ----------------------------<  66<L>  3.9   |  26  |  0.76    Ca    8.2<L>      08 May 2017 05:45  Mg     6.4     05-07    TPro  6.1<L>  /  Alb  2.4<L>  /  TBili  0.4  /  DBili  x   /  AST  39<H>  /  ALT  70<H>  /  AlkPhos  172<H>  05-08    PT/INR - ( 06 May 2017 18:09 )   PT: 10.0 sec;   INR: 0.90          PTT - ( 06 May 2017 18:09 )  PTT:30.7 sec    RADIOLOGY & ADDITIONAL TESTS:    MEDICATIONS  (STANDING):  oxytocin Infusion 333.333milliUNIT(s)/Min IV Continuous <Continuous>  oxytocin Infusion 41.667milliUNIT(s)/Min IV Continuous <Continuous>  lactated ringers. 1000milliLiter(s) IV Continuous <Continuous>  oxytocin Infusion 41.667milliUNIT(s)/Min IV Continuous <Continuous>  ferrous    sulfate 325milliGRAM(s) Oral daily  prenatal multivitamin 1Tablet(s) Oral daily  heparin  Injectable 5000Unit(s) SubCutaneous every 12 hours  hydrALAZINE Injectable 5milliGRAM(s) IV Push once  magnesium sulfate Infusion 2Gm/Hr IV Continuous <Continuous>  lactated ringers. 1000milliLiter(s) IV Continuous <Continuous>  NIFEdipine XL 30milliGRAM(s) Oral daily  labetalol 200milliGRAM(s) Oral every 8 hours    MEDICATIONS  (PRN):  acetaminophen   Tablet 650milliGRAM(s) Oral every 6 hours PRN For Temp greater than 38.5 C (101.3 F)  oxyCODONE  5 mG/acetaminophen 325 mG 1Tablet(s) Oral every 3 hours PRN Moderate Pain  oxyCODONE  5 mG/acetaminophen 325 mG 2Tablet(s) Oral every 6 hours PRN Severe Pain  simethicone 80milliGRAM(s) Chew every 4 hours PRN Gas  diphenhydrAMINE   Capsule 25milliGRAM(s) Oral every 6 hours PRN Itching  glycerin Suppository - Adult 1Suppository(s) Rectal at bedtime PRN Constipation  docusate sodium 100milliGRAM(s) Oral two times a day PRN Stool Softening  lanolin Ointment 1Application(s) Topical every 3 hours PRN Sore Nipples  aluminum hydroxide/magnesium hydroxide/simethicone Suspension 30milliLiter(s) Oral every 4 hours PRN Dyspepsia  acetaminophen   Tablet. 650milliGRAM(s) Oral every 6 hours PRN Mild Pain (1 - 3)

## 2017-05-08 NOTE — DIETITIAN INITIAL EVALUATION ADULT. - OTHER INFO
32 year old Female s/p  delivery day 6, currently admitted with pre-eclampsia. Pt is currently on a regular diet and tolerating PO. Consuming >75% of meals. States she is passing gas and having regular BMs. Pt is currently breastfeeding/pumping. Provided lactation nutrition therapy. Pt was receptive and expressed understanding. Pt has allergy to peanuts. Does not have any other dietary restrictions. Discussed reduced sodium in diet. Skin integrity intact except for surgical incision and GI WDL per flowsheet. 32 year old Female s/p  delivery day 6, admitted with pre-eclampsia. Pt is currently on a regular diet and tolerating PO. Consuming >75% of meals. States she is passing gas and having regular BMs. Pt is currently breastfeeding/pumping. Provided lactation nutrition therapy. Pt was receptive and expressed understanding. Pt has allergy to peanuts. Does not have any other dietary restrictions. Discussed reduced sodium in diet. Skin integrity intact except for surgical incision and GI WDL per flowsheet. Pain well controlled at present.

## 2017-05-08 NOTE — PROGRESS NOTE ADULT - PROBLEM SELECTOR PLAN 1
Overnight, BP was elevated with SBP in the 150-160's following which Labetalol 200mg Q8h was initiated. Currently prior to BP are within goal.  -Will recommend continuation of Labetalol 200mg Q8h, Procardia 30mg once daily  -Goal SBP <150/90  -Will continue to monitor Overnight, BP was elevated with SBP in the 150-160's following which Labetalol 200mg Q8h was initiated. Currently prior to BP are within goal.  -Will recommend continuation of Labetalol 200mg Q8h, Procardia 30mg once daily  -Will recommend administration of Procardia 30mg once stat, and will reassess need for bid dosing given clinical response  -Goal SBP <150/90  -Will continue to monitor

## 2017-05-08 NOTE — PROGRESS NOTE ADULT - SUBJECTIVE AND OBJECTIVE BOX
Called to bedside for bp of 165/85, repeat bp in 150s. Patient denies HA, scotoma, difficulty breathing. No RUQ pain or changes in vision over the past 24 hours. Pt was currently  breast feeding and no in any acute distress    Physical Exam:  Vital Signs Last 24 Hrs  T(C): 37, Max: 37.1 (05-07 @ 21:06)  T(F): 98.6, Max: 98.7 (05-07 @ 21:06)  HR: 60 (60 - 88)  BP: 152/84 (123/76 - 165/85)  BP(mean): 93 (93 - 93)  RR: 16 (16 - 18)  SpO2: 96% (94% - 96%)    Gen: NAD, AAOx3  Normal work of breathing  CV RRR  Lungs CTAB  Extremities Reflexes wnl, 2+, no edema

## 2017-05-08 NOTE — PROGRESS NOTE ADULT - SUBJECTIVE AND OBJECTIVE BOX
Patient evaluated at bedside.   She reports pain is well controlled with Percocet and Motrin.  She denies headache, dizziness, chest pain, palpitations, shortness of breathe, nausea, vomiting or heavy vaginal bleeding.  She has been ambulating without assistance, voiding spontaneously, passing gas, tolerating regular diet and is breastfeeding.    PHYSICAL EXAM:  VSS.  Still with mildly elevated BP's.  149/77 @ 14:00.  current BP: 141/92    GA: NAD, A+0 x 3  CV: RRR  Pulm: CTA BL  Breasts: soft, nontender, no palpable masses  Abd: ( + ) BS, soft, nontender, nondistended, no rebound or guarding,   Incision: clean, dry and intact; steri-strips in place  Uterus: Fundus midline; firm at 4  fb below umbilicus  : lochia WNL  Extremities: no swelling or calf tenderness, reflexes +2 bilaterally                        10.0   10.0  )-----------( 246      ( 08 May 2017 05:45 )             30.2     05-    141  |  107  |  9   ----------------------------<  66<L>  3.9   |  26  |  0.76    Ca    8.2<L>      08 May 2017 05:45  Mg     6.4     -    TPro  6.1<L>  /  Alb  2.4<L>  /  TBili  0.4  /  DBili  x   /  AST  39<H>  /  ALT  70<H>  /  AlkPhos  172<H>        PT/INR - ( 06 May 2017 18:09 )   PT: 10.0 sec;   INR: 0.90          PTT - ( 06 May 2017 18:09 )  PTT:30.7 sec    Assessment:    POD # 6 s/p  delivery    Plan:    Diet: regular diet as tolerated  Labs: stable.  No need to repeat  Pain meds: Continue Percocet and Motrin q 6 hrs as needed  Activity: OOB ad ila  Change Nifedipine order to XR 60 po qd  Disposition: Will observe BP's overnight since there was a change in meds this evening.  Likely discharge home in AM.

## 2017-05-08 NOTE — DIETITIAN INITIAL EVALUATION ADULT. - ENERGY NEEDS
Height: 5'3" Weight: 165lbs, IBW pre-pregnancy 115lbs+/-10%, %IBW pre-pregnancy 117%, BMI pre-pregnancy 23.9  UBW used to calculate energy needs d/t being w/in % IBW  Needs adjusted based on s/p c/s and lactation (+300kcal/day)  Energy needs estimated to be 1830-2136kcal/day (25-30kcal/kg +300kcal/day) Height: 5'3" Weight: 165lbs, IBW pre-pregnancy 115lbs+/-10%, %IBW pre-pregnancy 117%, BMI pre-pregnancy 23.9  UBW used to calculate energy needs d/t being w/in % IBW prepregnancy.   Needs adjusted based on s/p c/s and lactation (+300kcal/day)  Energy needs estimated to be 1830-2136kcal/day (25-30kcal/kg +300kcal/day)

## 2017-05-08 NOTE — PROGRESS NOTE ADULT - SUBJECTIVE AND OBJECTIVE BOX
At bedside for report of /95.  Pt denies HA, visual changes, RUQ/epigastric pain, n/v, new onset edema.  PE unremarkable, DTRs 1+.      Pt to receive Labetalol 200 dose 30 minutes early to scheduled.  Repeat BP 30 minutes s/p and nurse to let team know result regardless of value being elevated or normotensive.    Discussed with Dr. Cleveland

## 2017-05-09 RX ADMIN — Medication 300 MILLIGRAM(S): at 06:20

## 2017-05-09 RX ADMIN — Medication 300 MILLIGRAM(S): at 22:10

## 2017-05-09 RX ADMIN — Medication 300 MILLIGRAM(S): at 13:56

## 2017-05-09 RX ADMIN — Medication 100 MILLIGRAM(S): at 06:20

## 2017-05-09 RX ADMIN — Medication 60 MILLIGRAM(S): at 06:20

## 2017-05-09 RX ADMIN — Medication 1 TABLET(S): at 12:14

## 2017-05-09 RX ADMIN — Medication 100 MILLIGRAM(S): at 12:14

## 2017-05-09 RX ADMIN — HEPARIN SODIUM 5000 UNIT(S): 5000 INJECTION INTRAVENOUS; SUBCUTANEOUS at 06:21

## 2017-05-09 RX ADMIN — Medication 325 MILLIGRAM(S): at 12:14

## 2017-05-09 NOTE — PROGRESS NOTE ADULT - SUBJECTIVE AND OBJECTIVE BOX
S: Patient doing well. +flatus, tolerating regular diet. Pain controlled. Ambulating.   Overnight had elevated /95 therefore received labetalol dose 30min earlier. Increased labetalol dose to 300mg tid. Has been doing well since. denies headaches, visual changes, RUQ or epigastric pain, SOB or CP/palpitations.    O: Vital Signs Last 24 Hrs  T(C): 36.7, Max: 36.8 (05-08 @ 09:47)  T(F): 98.1, Max: 98.3 (05-08 @ 09:47)  HR: 68 (60 - 75)  BP: 128/77 (128/77 - 173/95)  BP(mean): --  RR: 17 (14 - 20)  SpO2: 99% (96% - 99%)    Gen: NAD  Abdomen: soft, NTND, +BS. gravid uterus, nontender, firm below umbilicus  Incision: C/D/I  Ext: no calf tenderness      Labs:     A/P: 33yo POD#7 s/p CS with occasional severe range BP, now stable.  Continue Nifedipine 30XL bid, labetalol 300mg tid. Monitor BPs.  Pain control  Ambulation  Regular diet  Routine postop care. S: Patient doing well. +flatus, tolerating regular diet. Pain controlled. Ambulating.   Overnight had elevated /95 therefore received labetalol dose 30min earlier. Increased labetalol dose to 300mg tid. Has been doing well since. denies headaches, visual changes, RUQ or epigastric pain, SOB or CP/palpitations.    O: Vital Signs Last 24 Hrs  T(C): 36.7, Max: 36.8 (05-08 @ 09:47)  T(F): 98.1, Max: 98.3 (05-08 @ 09:47)  HR: 68 (60 - 75)  BP: 128/77 (128/77 - 173/95)  BP(mean): --  RR: 17 (14 - 20)  SpO2: 99% (96% - 99%)    Gen: NAD  Abdomen: soft, NTND, +BS. gravid uterus, nontender, firm below umbilicus  Incision: C/D/I  Ext: no calf tenderness      Labs:     A/P: 33yo POD#7 s/p CS with occasional severe range BP, now stable.  Continue Nifedipine 60XL, labetalol 300mg tid. Monitor BPs.  Pain control  Ambulation  Regular diet  Routine postop care.

## 2017-05-09 NOTE — PROGRESS NOTE ADULT - SUBJECTIVE AND OBJECTIVE BOX
Dr. Mynor Elizondo  Renal Fellow  Beeper # [phone]        Patient seen and examined at bedside.   had increase bp to 170s overnight  had increase titration of medication to nifedipine 60 mg daily and labetolol 300 mg tid  currently bp 127/80  denies headache,  vision abnormalities,      oxytocin Infusion 333.333milliUNIT(s)/Min <Continuous>  oxytocin Infusion 41.667milliUNIT(s)/Min <Continuous>  lactated ringers. 1000milliLiter(s) <Continuous>  acetaminophen   Tablet 650milliGRAM(s) every 6 hours PRN  oxyCODONE  5 mG/acetaminophen 325 mG 1Tablet(s) every 3 hours PRN  oxyCODONE  5 mG/acetaminophen 325 mG 2Tablet(s) every 6 hours PRN  simethicone 80milliGRAM(s) every 4 hours PRN  diphenhydrAMINE   Capsule 25milliGRAM(s) every 6 hours PRN  oxytocin Infusion 41.667milliUNIT(s)/Min <Continuous>  glycerin Suppository - Adult 1Suppository(s) at bedtime PRN  docusate sodium 100milliGRAM(s) two times a day PRN  lanolin Ointment 1Application(s) every 3 hours PRN  ferrous    sulfate 325milliGRAM(s) daily  prenatal multivitamin 1Tablet(s) daily  heparin  Injectable 5000Unit(s) every 12 hours  aluminum hydroxide/magnesium hydroxide/simethicone Suspension 30milliLiter(s) every 4 hours PRN  hydrALAZINE Injectable 5milliGRAM(s) once  magnesium sulfate Infusion 2Gm/Hr <Continuous>  lactated ringers. 1000milliLiter(s) <Continuous>  acetaminophen   Tablet. 650milliGRAM(s) every 6 hours PRN  NIFEdipine XL 60milliGRAM(s) daily  labetalol 300milliGRAM(s) every 8 hours      Allergies    No Known Drug Allergies  peanuts (Unknown)    Intolerances        T(C): , Max: 36.8 (05-09 @ 10:00)  T(F): , Max: 98.2 (05-09 @ 10:00)  HR: 87  BP: 127/80  BP(mean): --  RR: 16  SpO2: 97%  Wt(kg): --        PHYSICAL EXAM:  Constitutional: Well appearning.  No acute distress  ENMT: Moist mucous membrane.  No cyanosis.  Neck: Supple. No JVD.  Back: No CVA tenderness  Respiratory: Clear to auscultation   Cardiovascular: S1, S2.  Regular rate and rhythm.    Gastrointestinal: soft, non-tender, non-distended, normal bowel sounds  Extremities: Warm.  No lower extremity edema.    Neurological: No focal deficits.  Skin: Warm. Dry.    Lymph Nodes:  No cervical lymph nodes.    Psychiatric: Normal affect.    ACCESS:     LABS:                        10.0   10.0  )-----------( 246      ( 08 May 2017 05:45 )             30.2     05-08    141  |  107  |  9   ----------------------------<  66<L>  3.9   |  26  |  0.76    Ca    8.2<L>      08 May 2017 05:45    TPro  6.1<L>  /  Alb  2.4<L>  /  TBili  0.4  /  DBili  x   /  AST  39<H>  /  ALT  70<H>  /  AlkPhos  172<H>  05-08                RADIOLOGY & ADDITIONAL STUDIES:

## 2017-05-09 NOTE — PROGRESS NOTE ADULT - PROBLEM SELECTOR PLAN 1
Overnight, BP was elevated with SBP in the 170 /95 following which Labetalol 300mg Q8h was initiated. Currently prior to BP are within goal.  -Will recommend continuation of Labetalol 300mg Q8h, Procardia 60mg once daily as bp currently controlled to 127/80    -Goal SBP <150/90  -Will continue to monitor

## 2017-05-10 VITALS
DIASTOLIC BLOOD PRESSURE: 84 MMHG | HEART RATE: 70 BPM | TEMPERATURE: 98 F | SYSTOLIC BLOOD PRESSURE: 130 MMHG | OXYGEN SATURATION: 97 % | RESPIRATION RATE: 18 BRPM

## 2017-05-10 PROCEDURE — 88307 TISSUE EXAM BY PATHOLOGIST: CPT

## 2017-05-10 PROCEDURE — 81003 URINALYSIS AUTO W/O SCOPE: CPT

## 2017-05-10 PROCEDURE — 99232 SBSQ HOSP IP/OBS MODERATE 35: CPT | Mod: GC

## 2017-05-10 PROCEDURE — 83735 ASSAY OF MAGNESIUM: CPT

## 2017-05-10 PROCEDURE — 84550 ASSAY OF BLOOD/URIC ACID: CPT

## 2017-05-10 PROCEDURE — 85610 PROTHROMBIN TIME: CPT

## 2017-05-10 PROCEDURE — 86780 TREPONEMA PALLIDUM: CPT

## 2017-05-10 PROCEDURE — 86900 BLOOD TYPING SEROLOGIC ABO: CPT

## 2017-05-10 PROCEDURE — 85027 COMPLETE CBC AUTOMATED: CPT

## 2017-05-10 PROCEDURE — 36415 COLL VENOUS BLD VENIPUNCTURE: CPT

## 2017-05-10 PROCEDURE — 85025 COMPLETE CBC W/AUTO DIFF WBC: CPT

## 2017-05-10 PROCEDURE — 85384 FIBRINOGEN ACTIVITY: CPT

## 2017-05-10 PROCEDURE — 80053 COMPREHEN METABOLIC PANEL: CPT

## 2017-05-10 PROCEDURE — 85730 THROMBOPLASTIN TIME PARTIAL: CPT

## 2017-05-10 PROCEDURE — 86850 RBC ANTIBODY SCREEN: CPT

## 2017-05-10 PROCEDURE — 90715 TDAP VACCINE 7 YRS/> IM: CPT

## 2017-05-10 PROCEDURE — 86901 BLOOD TYPING SEROLOGIC RH(D): CPT

## 2017-05-10 PROCEDURE — 83615 LACTATE (LD) (LDH) ENZYME: CPT

## 2017-05-10 RX ORDER — OXYCODONE AND ACETAMINOPHEN 5; 325 MG/1; MG/1
1 TABLET ORAL
Qty: 15 | Refills: 0
Start: 2017-05-10 | End: 2017-05-17

## 2017-05-10 RX ORDER — LABETALOL HCL 100 MG
1 TABLET ORAL
Qty: 90 | Refills: 1 | OUTPATIENT
Start: 2017-05-10 | End: 2017-07-08

## 2017-05-10 RX ORDER — NIFEDIPINE 30 MG
1 TABLET, EXTENDED RELEASE 24 HR ORAL
Qty: 0 | Refills: 0 | DISCHARGE
Start: 2017-05-10

## 2017-05-10 RX ORDER — LABETALOL HCL 100 MG
1 TABLET ORAL
Qty: 0 | Refills: 0 | DISCHARGE
Start: 2017-05-10

## 2017-05-10 RX ORDER — NIFEDIPINE 30 MG
60 TABLET, EXTENDED RELEASE 24 HR ORAL DAILY
Qty: 0 | Refills: 0 | Status: DISCONTINUED | OUTPATIENT
Start: 2017-05-10 | End: 2017-05-10

## 2017-05-10 RX ORDER — NIFEDIPINE 30 MG
1 TABLET, EXTENDED RELEASE 24 HR ORAL
Qty: 60 | Refills: 1 | OUTPATIENT
Start: 2017-05-10 | End: 2017-07-08

## 2017-05-10 RX ORDER — NIFEDIPINE 30 MG
1 TABLET, EXTENDED RELEASE 24 HR ORAL
Qty: 30 | Refills: 1 | OUTPATIENT
Start: 2017-05-10 | End: 2017-07-08

## 2017-05-10 RX ADMIN — Medication 1 TABLET(S): at 09:14

## 2017-05-10 RX ADMIN — Medication 325 MILLIGRAM(S): at 09:14

## 2017-05-10 RX ADMIN — Medication 300 MILLIGRAM(S): at 06:45

## 2017-05-10 RX ADMIN — Medication 60 MILLIGRAM(S): at 06:46

## 2017-05-10 RX ADMIN — Medication 300 MILLIGRAM(S): at 14:01

## 2017-05-10 RX ADMIN — Medication 100 MILLIGRAM(S): at 06:46

## 2017-05-10 NOTE — PROGRESS NOTE ADULT - SUBJECTIVE AND OBJECTIVE BOX
Dr. Mynor Elizondo  Renal Fellow  Beeper # [phone]        Patient seen and examined at bedside.   reporits doing well this am  no headaches, visual abnormalities  bp rangin from 118-78 to 145 /87    oxytocin Infusion 333.333milliUNIT(s)/Min <Continuous>  oxytocin Infusion 41.667milliUNIT(s)/Min <Continuous>  lactated ringers. 1000milliLiter(s) <Continuous>  acetaminophen   Tablet 650milliGRAM(s) every 6 hours PRN  simethicone 80milliGRAM(s) every 4 hours PRN  diphenhydrAMINE   Capsule 25milliGRAM(s) every 6 hours PRN  oxytocin Infusion 41.667milliUNIT(s)/Min <Continuous>  glycerin Suppository - Adult 1Suppository(s) at bedtime PRN  docusate sodium 100milliGRAM(s) two times a day PRN  lanolin Ointment 1Application(s) every 3 hours PRN  ferrous    sulfate 325milliGRAM(s) daily  prenatal multivitamin 1Tablet(s) daily  heparin  Injectable 5000Unit(s) every 12 hours  aluminum hydroxide/magnesium hydroxide/simethicone Suspension 30milliLiter(s) every 4 hours PRN  hydrALAZINE Injectable 5milliGRAM(s) once  magnesium sulfate Infusion 2Gm/Hr <Continuous>  lactated ringers. 1000milliLiter(s) <Continuous>  acetaminophen   Tablet. 650milliGRAM(s) every 6 hours PRN  NIFEdipine XL 60milliGRAM(s) daily  labetalol 300milliGRAM(s) every 8 hours      Allergies    No Known Drug Allergies  peanuts (Unknown)    Intolerances        T(C): , Max: 37.1 (05-10 @ 06:46)  T(F): , Max: 98.7 (05-10 @ 06:46)  HR: 77  BP: 118/78  BP(mean): --  RR: 18  SpO2: 97%  Wt(kg): --        PHYSICAL EXAM:  Constitutional: Well appearning.  No acute distress  ENMT: Moist mucous membrane.  No cyanosis.  Neck: Supple. No JVD.  Back: No CVA tenderness  Respiratory: Clear to auscultation   Cardiovascular: S1, S2.  Regular rate and rhythm.    Gastrointestinal: soft, non-tender, non-distended, normal bowel sounds  Extremities: Warm.  No lower extremity edema.    Neurological: No focal deficits.  Skin: Warm. Dry.    Lymph Nodes:  No cervical lymph nodes.    Psychiatric: Normal affect.        LABS:                      RADIOLOGY & ADDITIONAL STUDIES:

## 2017-05-10 NOTE — PROGRESS NOTE ADULT - ASSESSMENT
32 year old Female s/p  delivery day 6, currently admitted with pre-eclampsia
32 year old  s/p primary CS for macrosomia, now post-op day #2  1. Vital signs stable, continue to monitor per protocol  2. Pain controlled with motrin and percocet  3. DVT prophylaxis: subq heparin  4. GI/: patient not yet passing flatus. mildly distended. encouraged to ambulate. continue clear liquid diet until flatus  5. Disposition: oob and ambulate
32 year old Female s/p  delivery day 6, currently admitted with pre-eclampsia
32 year old Female s/p  delivery day 6, currently admitted with pre-eclampsia
32 year old patient post-op day #3 s/p primary  section due to concern over possible macrosomia  1. Vital signs stable, continue to monitor per protocol  2. Pain controlled with percocet and motrin  3. DVT prophylaxis: subq heparin  4. GI/: regular diet as tolerated. patient passing flatus  5. Disposition: oob and ambulate. anticipate discharge today/tomorrow.
32 year old patient post-op day #8 s/p primary  section for macrosomia with post-op course complicated by preeclampsia  1. Vital signs stable, continue to monitor per protocol  2. Pain controlled with oral pain medication  3. DVT prophylaxis: ambulation  4. GI/: regular diet  5. Disposition: FL home today. Follow up outpatient with nephrology and Dr. Contreras. prescriptions sent to patient pharmacy. Patient ordered BP cuff, which is to be delivered today. Follow up with Dr. Wagoner in 1 week for routine post-op check. S/sx of worsening pereclampsia reviweed with patient. Patient and spouse verbalize understanding.
A/P  31yo s/p c/s complicated by preeclampsia with severe features POD # 6.  Pt asymptomatic. Continue to monitor with repeat BP in an hour, pending full labs this AM    Diet: clears until flatus  Pain: OPM  IV fluid: Saline lock after discontinuation of MgSO4  F/u AM full labs  OOB/SCDs/IS  Continue to monitor  Anticipate discharge after stable BPs
A/P  32y  s/p , POD #4, now w/ suspected severe PEC  1. IV hydralazine 5 mg pushed  2. PEC labs sent  3. Repeat bp in 20 min  4. Will start IV pain meds if bp still unstable or lab abnormalities    D/W Dr. Durán.
A/P  33yo s/p c/s complicated by preeclampsia with severe features POD # 1. Pt stable with BP range 109/74 - 136/73 overnight. Pt asymptomatic. Continue to monitor - will plan to DC MgSO4 at 24hrs, pending full labs this AM    Diet: clears until flatus  Pain: OPM  IV fluid: Saline lock after discontinuation of MgSO4  F/u AM full labs  OOB/SCDs/IS  Continue to monitor  Anticipate discharge POD #3 or #4
pre-eclampsia

## 2017-05-10 NOTE — PROGRESS NOTE ADULT - ATTENDING COMMENTS
Pt seen and examined.  Doing well.  Tolerating regular diet but no flatus.  I agree with exam as documented.  Plan:  Ambulate to halls.  May continue regular diet as tolerated
BP's in much better range.  Nephrologically stable for dc home today.  Reviewed home BP technique.  For f/u with Dr. Contreras by phone tomorrow and OV per his directions.
pt seen and examined  IV Magnesium discontinued  BP okay' with parameters given for Labatelol should there be a rise in BP  follow LFTs which are slowly rising

## 2017-05-10 NOTE — PROGRESS NOTE ADULT - SUBJECTIVE AND OBJECTIVE BOX
Patient evaluated at bedside.   She reports pain is well controlled with oral pain medication  She denies headache, dizziness, chest pain, palpitations, shortness of breathe, nausea, vomiting or heavy vaginal bleeding.  She has been ambulating without assistance, voiding spontaneously, passing gas, tolerating regular diet and is breastfeeding.    Physical Exam:  Vital Signs Last 24 Hrs  T(C): 36.9, Max: 37.1 (05-10 @ 06:46)  T(F): 98.4, Max: 98.7 (05-10 @ 06:46)  HR: 71 (66 - 91)  BP: 125/84 (106/75 - 145/87)  BP(mean): --  RR: 18 (16 - 18)  SpO2: 97% (97% - 100%)    GA: NAD, A+0 x 3. Denies headache/scotoma/RUQ pain.   CV: RRR  Pulm: CTAB  Breasts: soft, nontender, no palpable masses  Abd: + BS, soft, nontender, nondistended, no rebound or guarding, incision clean, dry and intact, uterus firm at midline,  fb below umbilicus  : lochia WNL  Rios: voiding freely  Extremities: no swelling or calf tenderness, reflexes +2 bilaterally

## 2017-05-10 NOTE — PROGRESS NOTE ADULT - PROBLEM SELECTOR PROBLEM 1
Pre-eclampsia, unspecified trimester

## 2017-05-12 DIAGNOSIS — R74.0 NONSPECIFIC ELEVATION OF LEVELS OF TRANSAMINASE AND LACTIC ACID DEHYDROGENASE [LDH]: ICD-10-CM

## 2017-05-12 DIAGNOSIS — O36.63X0 MATERNAL CARE FOR EXCESSIVE FETAL GROWTH, THIRD TRIMESTER, NOT APPLICABLE OR UNSPECIFIED: ICD-10-CM

## 2017-05-12 DIAGNOSIS — Z3A.40 40 WEEKS GESTATION OF PREGNANCY: ICD-10-CM

## 2017-05-18 ENCOUNTER — APPOINTMENT (OUTPATIENT)
Dept: NEPHROLOGY | Facility: CLINIC | Age: 32
End: 2017-05-18

## 2017-05-18 VITALS — SYSTOLIC BLOOD PRESSURE: 125 MMHG | HEART RATE: 63 BPM | DIASTOLIC BLOOD PRESSURE: 82 MMHG

## 2017-05-18 VITALS — WEIGHT: 140 LBS | BODY MASS INDEX: 24.8 KG/M2

## 2017-05-18 DIAGNOSIS — R80.9 PROTEINURIA, UNSPECIFIED: ICD-10-CM

## 2017-05-18 DIAGNOSIS — R03.0 ELEVATED BLOOD-PRESSURE READING, W/OUT DIAGNOSIS OF HYPERTENSION: ICD-10-CM

## 2017-05-18 RX ORDER — NIFEDIPINE 30 MG/1
30 TABLET, EXTENDED RELEASE ORAL DAILY
Qty: 30 | Refills: 1 | Status: DISCONTINUED | COMMUNITY
Start: 2017-05-13 | End: 2017-05-18

## 2017-06-21 PROBLEM — R03.0 ELEVATED BLOOD PRESSURE, SITUATIONAL: Status: ACTIVE | Noted: 2017-06-21

## 2017-06-21 PROBLEM — R80.9 PROTEINURIA: Status: ACTIVE | Noted: 2017-06-21

## 2017-06-21 LAB
CREAT SPEC-SCNC: 139 MG/DL
CREAT SPEC-SCNC: 139 MG/DL
CREAT/PROT UR: 0.1 RATIO
MICROALBUMIN 24H UR DL<=1MG/L-MCNC: <0.3 MG/DL
MICROALBUMIN/CREAT 24H UR-RTO: NORMAL
PROT UR-MCNC: 10 MG/DL

## 2020-06-23 ENCOUNTER — APPOINTMENT (OUTPATIENT)
Dept: OBGYN | Facility: CLINIC | Age: 35
End: 2020-06-23
Payer: COMMERCIAL

## 2020-06-23 VITALS
BODY MASS INDEX: 24.63 KG/M2 | SYSTOLIC BLOOD PRESSURE: 100 MMHG | WEIGHT: 139 LBS | HEIGHT: 63 IN | DIASTOLIC BLOOD PRESSURE: 60 MMHG

## 2020-06-23 DIAGNOSIS — Z31.9 ENCOUNTER FOR PROCREATIVE MANAGEMENT, UNSPECIFIED: ICD-10-CM

## 2020-06-23 DIAGNOSIS — Z83.3 FAMILY HISTORY OF DIABETES MELLITUS: ICD-10-CM

## 2020-06-23 DIAGNOSIS — Z02.82 ENCOUNTER FOR ADOPTION SERVICES: ICD-10-CM

## 2020-06-23 PROCEDURE — 99385 PREV VISIT NEW AGE 18-39: CPT

## 2020-06-23 RX ORDER — LABETALOL HYDROCHLORIDE 300 MG/1
300 TABLET, FILM COATED ORAL
Qty: 30 | Refills: 0 | Status: COMPLETED | COMMUNITY
Start: 2017-05-13 | End: 2020-06-23

## 2020-06-23 NOTE — HISTORY OF PRESENT ILLNESS
[Definite:  ___ (Date)] : the last menstrual period was [unfilled] [Normal Amount/Duration] : was of a normal amount and duration [Spotting Between  Menses] : spotting reported between menses [Frequency: Q ___ days] : menstrual periods occur approximately every [unfilled] days [Menarche Age: ____] : age at menarche was [unfilled] [Sexually Active] : is sexually active [___ Year(s) Ago] : [unfilled] year(s) ago [Good] : being in good health [Healthy Diet] : a healthy diet [Regular Exercise] : regular exercise [Last Pap ___] : Last cervical pap smear was [unfilled] [Reproductive Age] : is of reproductive age [Monogamous] : is monogamous [Male ___] : [unfilled] male [No] : no [Weight Concerns] : no concerns with her weight [On BCP at conception] : the patient was not on BCP at conception [Menstrual Cramps] : no menstrual cramps [Contraception] : does not use contraception

## 2020-06-23 NOTE — CHIEF COMPLAINT
[Initial Visit] : initial GYN visit [FreeTextEntry1] : Initial office visit along with irregular cycles heavy with blood clots.

## 2020-06-24 LAB — HPV HIGH+LOW RISK DNA PNL CVX: NOT DETECTED

## 2020-06-26 LAB — CYTOLOGY CVX/VAG DOC THIN PREP: NORMAL

## 2020-07-07 ENCOUNTER — TRANSCRIPTION ENCOUNTER (OUTPATIENT)
Age: 35
End: 2020-07-07

## 2020-11-11 ENCOUNTER — APPOINTMENT (OUTPATIENT)
Dept: OBGYN | Facility: CLINIC | Age: 35
End: 2020-11-11
Payer: COMMERCIAL

## 2020-11-11 PROCEDURE — 99072 ADDL SUPL MATRL&STAF TM PHE: CPT

## 2020-11-11 PROCEDURE — 36415 COLL VENOUS BLD VENIPUNCTURE: CPT

## 2020-11-12 LAB
ESTRADIOL SERPL-MCNC: 40 PG/ML
FSH SERPL-MCNC: 6.6 IU/L
LH SERPL-ACNC: 8.5 IU/L
PROLACTIN SERPL-MCNC: 7.5 NG/ML
T4 FREE SERPL-MCNC: 1.3 NG/DL
TSH SERPL-ACNC: 1.41 UIU/ML

## 2020-11-19 LAB — ANTI-MUELLERIAN HORMONE: 4.97 NG/ML

## 2021-02-19 ENCOUNTER — APPOINTMENT (OUTPATIENT)
Dept: OBGYN | Facility: CLINIC | Age: 36
End: 2021-02-19

## 2021-09-01 ENCOUNTER — APPOINTMENT (OUTPATIENT)
Dept: OBGYN | Facility: CLINIC | Age: 36
End: 2021-09-01
Payer: COMMERCIAL

## 2021-09-01 VITALS — DIASTOLIC BLOOD PRESSURE: 80 MMHG | BODY MASS INDEX: 26.39 KG/M2 | SYSTOLIC BLOOD PRESSURE: 100 MMHG | WEIGHT: 149 LBS

## 2021-09-01 PROCEDURE — 56605 BIOPSY OF VULVA/PERINEUM: CPT

## 2021-09-29 ENCOUNTER — APPOINTMENT (OUTPATIENT)
Dept: OBGYN | Facility: CLINIC | Age: 36
End: 2021-09-29
Payer: COMMERCIAL

## 2021-09-29 VITALS
WEIGHT: 151.13 LBS | BODY MASS INDEX: 26.78 KG/M2 | SYSTOLIC BLOOD PRESSURE: 117 MMHG | HEIGHT: 63 IN | DIASTOLIC BLOOD PRESSURE: 70 MMHG

## 2021-09-29 PROCEDURE — 99213 OFFICE O/P EST LOW 20 MIN: CPT

## 2021-09-29 PROCEDURE — 36415 COLL VENOUS BLD VENIPUNCTURE: CPT

## 2021-09-29 NOTE — HISTORY OF PRESENT ILLNESS
[FreeTextEntry1] : JENI CABRALES IS A 36 year OLD WHO PRESENTS FOR AN ANNUAL GYN EXAM.  SHE REPORTS A HX OF HEAVY MENSES, SOME LONGER THAN USUAL.  MS. CABRALES IS S/P EB ON 9/1/21 THAT FOUND FRAGMENTS OF ENDOMETRIAL POLYPS\par

## 2021-09-29 NOTE — PHYSICAL EXAM
[Appropriately responsive] : appropriately responsive [Alert] : alert [No Acute Distress] : no acute distress [No Lymphadenopathy] : no lymphadenopathy [Regular Rate Rhythm] : regular rate rhythm [No Murmurs] : no murmurs [Clear to Auscultation B/L] : clear to auscultation bilaterally [Soft] : soft [Non-tender] : non-tender [Non-distended] : non-distended [No HSM] : No HSM [No Lesions] : no lesions [No Mass] : no mass [Oriented x3] : oriented x3 [Examination Of The Breasts] : a normal appearance [No Masses] : no breast masses were palpable [Labia Majora] : normal [Labia Minora] : normal [Normal] : normal [Tenderness] : nontender [Enlarged ___ wks] : enlarged [unfilled] ~Uweeks [Anteversion] : anteverted [Mass ___ cm] : no uterine mass was palpated [Uterine Adnexae] : normal

## 2021-09-29 NOTE — PLAN
[FreeTextEntry1] : I COUNSELLED MS. CABRALES REGARDING THE EFFECTS OF AN ENDOMETRIAL POLYP ON VAGINAL BLEEDING.  I EXPLAINED THAT THE BLEEDING CAN BE HEAVY, LONGER AND MORE IRREGULAR THAN HER USUAL MENSES.  SHE VERBALIZES UNDERSTANDING.  WE DISCUSSED A HYSTEROSCOPIC RESECTION AND D&C, INCLUDING THE RISKS AND BENEFITS OF THE PROCEDURE BUT AT THIS TIME DEFERS SURGICAL INTERVENTION AT THIS TIME.

## 2021-10-05 LAB
BASOPHILS # BLD AUTO: 0.04 K/UL
BASOPHILS NFR BLD AUTO: 0.6 %
CYTOLOGY CVX/VAG DOC THIN PREP: NORMAL
EOSINOPHIL # BLD AUTO: 0.16 K/UL
EOSINOPHIL NFR BLD AUTO: 2.3 %
HCT VFR BLD CALC: 25.9 %
HGB BLD-MCNC: 7 G/DL
IMM GRANULOCYTES NFR BLD AUTO: 0.3 %
LYMPHOCYTES # BLD AUTO: 1.53 K/UL
LYMPHOCYTES NFR BLD AUTO: 22.3 %
MAN DIFF?: NORMAL
MCHC RBC-ENTMCNC: 17.6 PG
MCHC RBC-ENTMCNC: 27 GM/DL
MCV RBC AUTO: 65.1 FL
MONOCYTES # BLD AUTO: 0.81 K/UL
MONOCYTES NFR BLD AUTO: 11.8 %
NEUTROPHILS # BLD AUTO: 4.29 K/UL
NEUTROPHILS NFR BLD AUTO: 62.7 %
PLATELET # BLD AUTO: 352 K/UL
RBC # BLD: 3.98 M/UL
RBC # FLD: 22 %
WBC # FLD AUTO: 6.85 K/UL

## 2021-12-22 ENCOUNTER — APPOINTMENT (OUTPATIENT)
Dept: OBGYN | Facility: CLINIC | Age: 36
End: 2021-12-22

## 2022-03-23 ENCOUNTER — APPOINTMENT (OUTPATIENT)
Dept: OBGYN | Facility: CLINIC | Age: 37
End: 2022-03-23
Payer: COMMERCIAL

## 2022-03-23 VITALS
OXYGEN SATURATION: 100 % | WEIGHT: 150 LBS | HEART RATE: 101 BPM | DIASTOLIC BLOOD PRESSURE: 72 MMHG | BODY MASS INDEX: 26.58 KG/M2 | HEIGHT: 63 IN | SYSTOLIC BLOOD PRESSURE: 110 MMHG

## 2022-03-23 PROCEDURE — 36415 COLL VENOUS BLD VENIPUNCTURE: CPT

## 2022-03-23 PROCEDURE — 99212 OFFICE O/P EST SF 10 MIN: CPT

## 2022-03-29 ENCOUNTER — NON-APPOINTMENT (OUTPATIENT)
Age: 37
End: 2022-03-29

## 2022-04-04 LAB
BASOPHILS # BLD AUTO: 0.04 K/UL
BASOPHILS NFR BLD AUTO: 0.4 %
EOSINOPHIL # BLD AUTO: 0.23 K/UL
EOSINOPHIL NFR BLD AUTO: 2.1 %
HCT VFR BLD CALC: 28.1 %
HGB BLD-MCNC: 7.8 G/DL
IMM GRANULOCYTES NFR BLD AUTO: 0.3 %
LYMPHOCYTES # BLD AUTO: 1.92 K/UL
LYMPHOCYTES NFR BLD AUTO: 17.2 %
MAN DIFF?: NORMAL
MCHC RBC-ENTMCNC: 18.3 PG
MCHC RBC-ENTMCNC: 27.8 GM/DL
MCV RBC AUTO: 65.8 FL
MONOCYTES # BLD AUTO: 0.9 K/UL
MONOCYTES NFR BLD AUTO: 8.1 %
NEUTROPHILS # BLD AUTO: 8.04 K/UL
NEUTROPHILS NFR BLD AUTO: 71.9 %
PLATELET # BLD AUTO: 299 K/UL
RBC # BLD: 4.27 M/UL
RBC # FLD: 23.1 %
WBC # FLD AUTO: 11.16 K/UL

## 2022-04-04 NOTE — DISCUSSION/SUMMARY
[FreeTextEntry1] : JENI CABRALES IS A 36 year OLD WHO PRESENTS FOR AN CONSULTATION TO DISCUSS ULTRASOUND AND NEXT STEPS. SHE IS WITHOUT COMPLAINTS.\par

## 2022-04-27 ENCOUNTER — APPOINTMENT (OUTPATIENT)
Dept: HEMATOLOGY ONCOLOGY | Facility: CLINIC | Age: 37
End: 2022-04-27
Payer: COMMERCIAL

## 2022-04-27 ENCOUNTER — LABORATORY RESULT (OUTPATIENT)
Age: 37
End: 2022-04-27

## 2022-04-27 ENCOUNTER — APPOINTMENT (OUTPATIENT)
Dept: OBGYN | Facility: CLINIC | Age: 37
End: 2022-04-27
Payer: COMMERCIAL

## 2022-04-27 VITALS
BODY MASS INDEX: 26.58 KG/M2 | HEIGHT: 63 IN | DIASTOLIC BLOOD PRESSURE: 75 MMHG | SYSTOLIC BLOOD PRESSURE: 121 MMHG | OXYGEN SATURATION: 100 % | TEMPERATURE: 97.4 F | HEART RATE: 73 BPM | WEIGHT: 150 LBS

## 2022-04-27 PROCEDURE — 36415 COLL VENOUS BLD VENIPUNCTURE: CPT

## 2022-04-27 PROCEDURE — 99212 OFFICE O/P EST SF 10 MIN: CPT

## 2022-04-27 PROCEDURE — 99204 OFFICE O/P NEW MOD 45 MIN: CPT | Mod: 25

## 2022-04-27 RX ORDER — FLUCONAZOLE 150 MG/1
150 TABLET ORAL
Qty: 1 | Refills: 1 | Status: COMPLETED | COMMUNITY
Start: 2021-01-04 | End: 2022-04-27

## 2022-04-27 NOTE — ASSESSMENT
[FreeTextEntry1] : Repeat blood work to be done...\par \par Patient to receive intravenous iron when we get approval from her insurance company.

## 2022-04-27 NOTE — HISTORY OF PRESENT ILLNESS
[de-identified] : 37 years old female was found to have anemia, known at least since 2017... In 2017 she had proven iron deficiency state... No recent iron stores are available... Patient states her periods are heavy because of fibroids... No excessive bleed with her delivery... She only currently takes p.o. iron...

## 2022-04-27 NOTE — HISTORY OF PRESENT ILLNESS
[FreeTextEntry1] : Ms. Liset Wagoner presents to office for vaginal irritation with discharge that began a week and a half ago. Patient denies odor at this time and reports the discharge has lessened over the past few days without intervention. \par \par No other GYN concerns at this time.

## 2022-04-27 NOTE — PLAN
[FreeTextEntry1] : Patient to be contacted PRN regarding results and RTO as needed.\par \par All questions answered and patient is in agreement and understanding of plan.

## 2022-04-27 NOTE — REASON FOR VISIT
[Follow-Up] : a follow-up evaluation of [FreeTextEntry2] : yeast infection and slight external irritation

## 2022-04-27 NOTE — PHYSICAL EXAM
[Chaperone Present] : A chaperone was present in the examining room during all aspects of the physical examination [Labia Majora] : normal [Labia Minora] : normal [Normal] : normal [Discharge] : discharge [Scant] : scant [White] : white

## 2022-04-29 LAB
CANDIDA VAG CYTO: NOT DETECTED
G VAGINALIS+PREV SP MTYP VAG QL MICRO: NOT DETECTED
T VAGINALIS VAG QL WET PREP: NOT DETECTED

## 2022-04-30 ENCOUNTER — TRANSCRIPTION ENCOUNTER (OUTPATIENT)
Age: 37
End: 2022-04-30

## 2022-05-02 ENCOUNTER — TRANSCRIPTION ENCOUNTER (OUTPATIENT)
Age: 37
End: 2022-05-02

## 2022-05-05 RX ORDER — FERUMOXYTOL 510 MG/17ML
510 INJECTION INTRAVENOUS ONCE
Refills: 0 | Status: COMPLETED | OUTPATIENT
Start: 2022-06-17 | End: 2022-06-17

## 2022-05-06 ENCOUNTER — APPOINTMENT (OUTPATIENT)
Dept: INFUSION THERAPY | Facility: CLINIC | Age: 37
End: 2022-05-06

## 2022-05-18 ENCOUNTER — TRANSCRIPTION ENCOUNTER (OUTPATIENT)
Age: 37
End: 2022-05-18

## 2022-05-18 LAB
25(OH)D3 SERPL-MCNC: 27.7 NG/ML
ALBUMIN SERPL ELPH-MCNC: 4.4 G/DL
ALP BLD-CCNC: 50 U/L
ALT SERPL-CCNC: 16 U/L
ANION GAP SERPL CALC-SCNC: 11 MMOL/L
AST SERPL-CCNC: 17 U/L
BASOPHILS # BLD AUTO: 0.05 K/UL
BASOPHILS NFR BLD AUTO: 0.4 %
BILIRUB SERPL-MCNC: <0.2 MG/DL
BUN SERPL-MCNC: 12 MG/DL
CALCIUM SERPL-MCNC: 9.5 MG/DL
CHLORIDE SERPL-SCNC: 104 MMOL/L
CO2 SERPL-SCNC: 24 MMOL/L
CREAT SERPL-MCNC: 0.7 MG/DL
EGFR: 113 ML/MIN/1.73M2
EOSINOPHIL # BLD AUTO: 0.24 K/UL
EOSINOPHIL NFR BLD AUTO: 2 %
ERYTHROCYTE [SEDIMENTATION RATE] IN BLOOD BY WESTERGREN METHOD: 33 MM/HR
FERRITIN SERPL-MCNC: 8 NG/ML
GLUCOSE SERPL-MCNC: 83 MG/DL
HAPTOGLOB SERPL-MCNC: 105 MG/DL
HCT VFR BLD CALC: 33.2 %
HGB BLD-MCNC: 9.7 G/DL
IMM GRANULOCYTES NFR BLD AUTO: 0.2 %
IRON SATN MFR SERPL: 4 %
IRON SERPL-MCNC: 17 UG/DL
LDH SERPL-CCNC: 196 U/L
LYMPHOCYTES # BLD AUTO: 1.9 K/UL
LYMPHOCYTES NFR BLD AUTO: 15.7 %
MAN DIFF?: NORMAL
MCHC RBC-ENTMCNC: 20.7 PG
MCHC RBC-ENTMCNC: 29.2 GM/DL
MCV RBC AUTO: 70.9 FL
MONOCYTES # BLD AUTO: 0.83 K/UL
MONOCYTES NFR BLD AUTO: 6.9 %
NEUTROPHILS # BLD AUTO: 9.04 K/UL
NEUTROPHILS NFR BLD AUTO: 74.8 %
PLATELET # BLD AUTO: 249 K/UL
POTASSIUM SERPL-SCNC: 4.6 MMOL/L
PROT SERPL-MCNC: 7 G/DL
RBC # BLD: 4.68 M/UL
RBC # FLD: 28.3 %
SODIUM SERPL-SCNC: 139 MMOL/L
TIBC SERPL-MCNC: 444 UG/DL
TSH SERPL-ACNC: 1.67 UIU/ML
UIBC SERPL-MCNC: 427 UG/DL
VIT B12 SERPL-MCNC: 887 PG/ML
WBC # FLD AUTO: 12.09 K/UL

## 2022-05-19 ENCOUNTER — TRANSCRIPTION ENCOUNTER (OUTPATIENT)
Age: 37
End: 2022-05-19

## 2022-06-13 ENCOUNTER — APPOINTMENT (OUTPATIENT)
Dept: HEMATOLOGY ONCOLOGY | Facility: CLINIC | Age: 37
End: 2022-06-13

## 2022-06-13 ENCOUNTER — LABORATORY RESULT (OUTPATIENT)
Age: 37
End: 2022-06-13

## 2022-06-13 ENCOUNTER — APPOINTMENT (OUTPATIENT)
Dept: HEMATOLOGY ONCOLOGY | Facility: CLINIC | Age: 37
End: 2022-06-13
Payer: COMMERCIAL

## 2022-06-13 VITALS
DIASTOLIC BLOOD PRESSURE: 67 MMHG | TEMPERATURE: 97.9 F | SYSTOLIC BLOOD PRESSURE: 103 MMHG | WEIGHT: 153.5 LBS | HEIGHT: 63 IN | HEART RATE: 93 BPM | OXYGEN SATURATION: 100 % | BODY MASS INDEX: 27.2 KG/M2

## 2022-06-13 PROCEDURE — 99213 OFFICE O/P EST LOW 20 MIN: CPT | Mod: 25

## 2022-06-13 PROCEDURE — 36415 COLL VENOUS BLD VENIPUNCTURE: CPT

## 2022-06-14 ENCOUNTER — TRANSCRIPTION ENCOUNTER (OUTPATIENT)
Age: 37
End: 2022-06-14

## 2022-06-14 NOTE — CONSULT LETTER
[Dear  ___] : Dear  [unfilled], [Consult Letter:] : I had the pleasure of evaluating your patient, [unfilled]. [Please see my note below.] : Please see my note below. [Consult Closing:] : Thank you very much for allowing me to participate in the care of this patient.  If you have any questions, please do not hesitate to contact me. [Sincerely,] : Sincerely, [FreeTextEntry3] : Brisa Hines MD\par

## 2022-06-14 NOTE — ASSESSMENT
[FreeTextEntry1] : Repeat blood work to be done, and patient's hemoglobin is even lower... I placed orders for intravenous iron to be given at our infusion center...\par \par Patient to see me in follow-up in 2 weeks after her last intravenous iron infusion.\par \par

## 2022-06-14 NOTE — HISTORY OF PRESENT ILLNESS
[de-identified] : 37 years old female was found to have anemia, known at least since 2017... In 2017 she had proven iron deficiency state... No recent iron stores are available... Patient states her periods are heavy because of fibroids... No excessive bleed with her delivery... She only currently takes p.o. iron... [de-identified] : June 13, 2022 patient returns for follow-up she has been taking oral iron...

## 2022-06-16 ENCOUNTER — TRANSCRIPTION ENCOUNTER (OUTPATIENT)
Age: 37
End: 2022-06-16

## 2022-06-17 ENCOUNTER — TRANSCRIPTION ENCOUNTER (OUTPATIENT)
Age: 37
End: 2022-06-17

## 2022-06-17 ENCOUNTER — OUTPATIENT (OUTPATIENT)
Dept: OUTPATIENT SERVICES | Facility: HOSPITAL | Age: 37
LOS: 1 days | End: 2022-06-17
Payer: COMMERCIAL

## 2022-06-17 ENCOUNTER — APPOINTMENT (OUTPATIENT)
Dept: INFUSION THERAPY | Facility: CLINIC | Age: 37
End: 2022-06-17

## 2022-06-17 VITALS
OXYGEN SATURATION: 100 % | RESPIRATION RATE: 18 BRPM | WEIGHT: 153 LBS | DIASTOLIC BLOOD PRESSURE: 61 MMHG | SYSTOLIC BLOOD PRESSURE: 98 MMHG | HEIGHT: 63 IN | TEMPERATURE: 98 F | HEART RATE: 98 BPM

## 2022-06-17 DIAGNOSIS — D50.9 IRON DEFICIENCY ANEMIA, UNSPECIFIED: ICD-10-CM

## 2022-06-17 LAB — GLUCOSE BLDC GLUCOMTR-MCNC: 113 MG/DL — HIGH (ref 70–99)

## 2022-06-17 PROCEDURE — 82962 GLUCOSE BLOOD TEST: CPT

## 2022-06-17 PROCEDURE — 96365 THER/PROPH/DIAG IV INF INIT: CPT

## 2022-06-17 PROCEDURE — 36415 COLL VENOUS BLD VENIPUNCTURE: CPT

## 2022-06-17 RX ADMIN — FERUMOXYTOL 117 MILLIGRAM(S): 510 INJECTION INTRAVENOUS at 13:15

## 2022-06-17 RX ADMIN — FERUMOXYTOL 510 MILLIGRAM(S): 510 INJECTION INTRAVENOUS at 14:20

## 2022-06-24 ENCOUNTER — APPOINTMENT (OUTPATIENT)
Dept: INFUSION THERAPY | Facility: CLINIC | Age: 37
End: 2022-06-24

## 2022-06-24 ENCOUNTER — OUTPATIENT (OUTPATIENT)
Dept: OUTPATIENT SERVICES | Facility: HOSPITAL | Age: 37
LOS: 1 days | End: 2022-06-24
Payer: COMMERCIAL

## 2022-06-24 VITALS
HEART RATE: 80 BPM | TEMPERATURE: 99 F | RESPIRATION RATE: 18 BRPM | DIASTOLIC BLOOD PRESSURE: 71 MMHG | OXYGEN SATURATION: 98 % | WEIGHT: 153 LBS | SYSTOLIC BLOOD PRESSURE: 107 MMHG | HEIGHT: 63 IN

## 2022-06-24 DIAGNOSIS — D50.9 IRON DEFICIENCY ANEMIA, UNSPECIFIED: ICD-10-CM

## 2022-06-24 PROCEDURE — 96365 THER/PROPH/DIAG IV INF INIT: CPT

## 2022-06-24 RX ORDER — FERUMOXYTOL 510 MG/17ML
510 INJECTION INTRAVENOUS ONCE
Refills: 0 | Status: COMPLETED | OUTPATIENT
Start: 2022-06-24 | End: 2022-06-24

## 2022-06-24 RX ADMIN — FERUMOXYTOL 510 MILLIGRAM(S): 510 INJECTION INTRAVENOUS at 11:05

## 2022-06-24 RX ADMIN — FERUMOXYTOL 117 MILLIGRAM(S): 510 INJECTION INTRAVENOUS at 10:02

## 2022-06-29 LAB
APTT BLD: 28.3 SEC
BASOPHILS # BLD AUTO: 0.05 K/UL
BASOPHILS NFR BLD AUTO: 0.6 %
EOSINOPHIL # BLD AUTO: 0.36 K/UL
EOSINOPHIL NFR BLD AUTO: 4.5 %
FACT VIII ACT/NOR PPP: 145 %
HCT VFR BLD CALC: 27.9 %
HGB BLD-MCNC: 7.9 G/DL
IMM GRANULOCYTES NFR BLD AUTO: 0.3 %
INR PPP: 1 RATIO
LYMPHOCYTES # BLD AUTO: 1.65 K/UL
LYMPHOCYTES NFR BLD AUTO: 20.7 %
MAN DIFF?: NORMAL
MCHC RBC-ENTMCNC: 19.9 PG
MCHC RBC-ENTMCNC: 28.3 GM/DL
MCV RBC AUTO: 70.5 FL
MONOCYTES # BLD AUTO: 0.7 K/UL
MONOCYTES NFR BLD AUTO: 8.8 %
NEUTROPHILS # BLD AUTO: 5.18 K/UL
NEUTROPHILS NFR BLD AUTO: 65.1 %
PLATELET # BLD AUTO: 337 K/UL
PT BLD: 11.8 SEC
RBC # BLD: 3.96 M/UL
RBC # FLD: 23.4 %
WBC # FLD AUTO: 7.96 K/UL

## 2022-07-11 ENCOUNTER — APPOINTMENT (OUTPATIENT)
Dept: HEMATOLOGY ONCOLOGY | Facility: CLINIC | Age: 37
End: 2022-07-11

## 2022-07-11 ENCOUNTER — LABORATORY RESULT (OUTPATIENT)
Age: 37
End: 2022-07-11

## 2022-07-11 VITALS
OXYGEN SATURATION: 99 % | WEIGHT: 154 LBS | BODY MASS INDEX: 27.29 KG/M2 | HEIGHT: 63 IN | SYSTOLIC BLOOD PRESSURE: 131 MMHG | HEART RATE: 78 BPM | DIASTOLIC BLOOD PRESSURE: 73 MMHG | TEMPERATURE: 97.7 F

## 2022-07-11 PROCEDURE — 36415 COLL VENOUS BLD VENIPUNCTURE: CPT

## 2022-07-11 PROCEDURE — 99213 OFFICE O/P EST LOW 20 MIN: CPT | Mod: 25

## 2022-07-11 RX ORDER — GLUC/MSM/COLGN2/HYAL/ANTIARTH3 375-375-20
TABLET ORAL
Refills: 0 | Status: DISCONTINUED | COMMUNITY
End: 2022-07-11

## 2022-07-18 ENCOUNTER — TRANSCRIPTION ENCOUNTER (OUTPATIENT)
Age: 37
End: 2022-07-18

## 2022-07-18 LAB
BASOPHILS # BLD AUTO: 0.05 K/UL
BASOPHILS NFR BLD AUTO: 0.5 %
CHOLEST SERPL-MCNC: 136 MG/DL
EOSINOPHIL # BLD AUTO: 0.13 K/UL
EOSINOPHIL NFR BLD AUTO: 1.4 %
ESTIMATED AVERAGE GLUCOSE: 88 MG/DL
HBA1C MFR BLD HPLC: 4.7 %
HCT VFR BLD CALC: 33.5 %
HDLC SERPL-MCNC: 59 MG/DL
HGB BLD-MCNC: 9.9 G/DL
IMM GRANULOCYTES NFR BLD AUTO: 0.2 %
LDLC SERPL CALC-MCNC: 69 MG/DL
LYMPHOCYTES # BLD AUTO: 1.54 K/UL
LYMPHOCYTES NFR BLD AUTO: 16.2 %
MAN DIFF?: NORMAL
MCHC RBC-ENTMCNC: 24.2 PG
MCHC RBC-ENTMCNC: 29.6 GM/DL
MCV RBC AUTO: 81.9 FL
MONOCYTES # BLD AUTO: 0.65 K/UL
MONOCYTES NFR BLD AUTO: 6.9 %
NEUTROPHILS # BLD AUTO: 7.09 K/UL
NEUTROPHILS NFR BLD AUTO: 74.8 %
NONHDLC SERPL-MCNC: 77 MG/DL
PLATELET # BLD AUTO: 308 K/UL
RBC # BLD: 4.09 M/UL
RBC # FLD: NORMAL
TRIGL SERPL-MCNC: 42 MG/DL
TSH SERPL-ACNC: 1.79 UIU/ML
WBC # FLD AUTO: 9.48 K/UL

## 2022-07-19 ENCOUNTER — TRANSCRIPTION ENCOUNTER (OUTPATIENT)
Age: 37
End: 2022-07-19

## 2022-08-10 ENCOUNTER — APPOINTMENT (OUTPATIENT)
Dept: OBGYN | Facility: CLINIC | Age: 37
End: 2022-08-10

## 2022-08-26 ENCOUNTER — APPOINTMENT (OUTPATIENT)
Dept: OBGYN | Facility: CLINIC | Age: 37
End: 2022-08-26

## 2022-10-07 ENCOUNTER — APPOINTMENT (OUTPATIENT)
Dept: OBGYN | Facility: CLINIC | Age: 37
End: 2022-10-07

## 2022-10-07 VITALS
BODY MASS INDEX: 27.29 KG/M2 | HEIGHT: 63 IN | SYSTOLIC BLOOD PRESSURE: 109 MMHG | OXYGEN SATURATION: 100 % | WEIGHT: 154 LBS | HEART RATE: 73 BPM | DIASTOLIC BLOOD PRESSURE: 73 MMHG

## 2022-10-07 PROCEDURE — 36415 COLL VENOUS BLD VENIPUNCTURE: CPT

## 2022-10-07 PROCEDURE — 99213 OFFICE O/P EST LOW 20 MIN: CPT

## 2022-10-07 RX ORDER — FERROUS SULFATE TAB EC 325 MG (65 MG FE EQUIVALENT) 325 (65 FE) MG
325 (65 FE) TABLET DELAYED RESPONSE ORAL
Qty: 265 | Refills: 0 | Status: ACTIVE | COMMUNITY
Start: 2022-05-02

## 2022-10-07 RX ORDER — DOCUSATE SODIUM 100 MG/1
100 CAPSULE, LIQUID FILLED ORAL
Qty: 60 | Refills: 0 | Status: COMPLETED | COMMUNITY
Start: 2022-03-31 | End: 2022-10-07

## 2022-10-11 LAB
BASOPHILS # BLD AUTO: 0.05 K/UL
BASOPHILS NFR BLD AUTO: 0.5 %
EOSINOPHIL # BLD AUTO: 0.15 K/UL
EOSINOPHIL NFR BLD AUTO: 1.4 %
HCT VFR BLD CALC: 26.3 %
HGB BLD-MCNC: 7.6 G/DL
IMM GRANULOCYTES NFR BLD AUTO: 0.5 %
LYMPHOCYTES # BLD AUTO: 2.07 K/UL
LYMPHOCYTES NFR BLD AUTO: 19.7 %
MAN DIFF?: NORMAL
MCHC RBC-ENTMCNC: 23.1 PG
MCHC RBC-ENTMCNC: 28.9 GM/DL
MCV RBC AUTO: 79.9 FL
MONOCYTES # BLD AUTO: 0.7 K/UL
MONOCYTES NFR BLD AUTO: 6.6 %
NEUTROPHILS # BLD AUTO: 7.51 K/UL
NEUTROPHILS NFR BLD AUTO: 71.3 %
PLATELET # BLD AUTO: 329 K/UL
RBC # BLD: 3.29 M/UL
RBC # FLD: 18.1 %
WBC # FLD AUTO: 10.53 K/UL

## 2022-10-12 ENCOUNTER — NON-APPOINTMENT (OUTPATIENT)
Age: 37
End: 2022-10-12

## 2022-10-13 ENCOUNTER — APPOINTMENT (OUTPATIENT)
Dept: HEMATOLOGY ONCOLOGY | Facility: CLINIC | Age: 37
End: 2022-10-13

## 2022-10-13 ENCOUNTER — TRANSCRIPTION ENCOUNTER (OUTPATIENT)
Age: 37
End: 2022-10-13

## 2022-10-13 PROCEDURE — 99214 OFFICE O/P EST MOD 30 MIN: CPT | Mod: 95

## 2022-10-13 NOTE — ASSESSMENT
[FreeTextEntry1] : Since patient states she is feeling fine, I will do repeat blood work including iron studies to confirm iron deficiency anemia...\par \par Patient already increased her p.o. iron to 1 tablet 3 times a day, and I encouraged her to continue that.\par \par If repeat blood work indeed confirms iron deficiency anemia, I will arrange for patient to have intravenous iron.\par \par I will also reach out to Dr. Wagoner in order to coordinate surgery, while patient's hemoglobin is in acceptable range.

## 2022-10-13 NOTE — ASSESSMENT
[FreeTextEntry1] : Repeat blood work to be done... Hb is up to 9.9 g/dL, patient is now hematologically cleared for procedure indicated\par \par Report sent to Dr. Heidy Wagoner, patient's GYN, as patient is scheduled to have fibroid surgery, later on she is contemplating pregnancy.\par

## 2022-10-13 NOTE — HISTORY OF PRESENT ILLNESS
[de-identified] : 37 years old female was found to have anemia, known at least since 2017... In 2017 she had proven iron deficiency state... No recent iron stores are available... Patient states her periods are heavy because of fibroids... No excessive bleed with her delivery... She only currently takes p.o. iron... [de-identified] : June 13, 2022 patient returns for follow-up she has been taking oral iron...\par \par July 11, 2022 returns for follow-up after having received IV iron x2... Tolerated well... Reluctantly admitting that she feels better...

## 2022-10-13 NOTE — HISTORY OF PRESENT ILLNESS
[Other Location: e.g. School (Enter Location, City,State)___] : at [unfilled], at the time of the visit. [Other Location: e.g. Home (Enter Location, City,State)___] : at [unfilled] [Verbal consent obtained from patient] : the patient, [unfilled] [de-identified] : 37 years old female was found to have anemia, known at least since 2017... In 2017 she had proven iron deficiency state... No recent iron stores are available... Patient states her periods are heavy because of fibroids... No excessive bleed with her delivery... She only currently takes p.o. iron... [de-identified] : June 13, 2022 patient returns for follow-up she has been taking oral iron...\par \par July 11, 2022 returns for follow-up after having received IV iron x2... Tolerated well... Reluctantly admitting that she feels better...\par \par October 13, 2022 patient requested follow-up appointment since her hemoglobin is down to 7 g/dL again... She did not have a procedure with Dr. Wagoner yet... Patient states she is feeling fine...

## 2022-10-17 ENCOUNTER — TRANSCRIPTION ENCOUNTER (OUTPATIENT)
Age: 37
End: 2022-10-17

## 2022-10-20 ENCOUNTER — TRANSCRIPTION ENCOUNTER (OUTPATIENT)
Age: 37
End: 2022-10-20

## 2022-10-24 ENCOUNTER — TRANSCRIPTION ENCOUNTER (OUTPATIENT)
Age: 37
End: 2022-10-24

## 2022-10-25 LAB
BASOPHILS # BLD AUTO: 0.05 K/UL
BASOPHILS NFR BLD AUTO: 0.4 %
CYTOLOGY CVX/VAG DOC THIN PREP: ABNORMAL
EOSINOPHIL # BLD AUTO: 0.24 K/UL
EOSINOPHIL NFR BLD AUTO: 2.1 %
ERYTHROCYTE [SEDIMENTATION RATE] IN BLOOD BY WESTERGREN METHOD: 14 MM/HR
FERRITIN SERPL-MCNC: 6 NG/ML
HAPTOGLOB SERPL-MCNC: 92 MG/DL
HCT VFR BLD CALC: 27.4 %
HGB BLD-MCNC: 7.9 G/DL
IMM GRANULOCYTES NFR BLD AUTO: 0.2 %
IRON SATN MFR SERPL: 3 %
IRON SERPL-MCNC: 12 UG/DL
LDH SERPL-CCNC: 171 U/L
LYMPHOCYTES # BLD AUTO: 1.87 K/UL
LYMPHOCYTES NFR BLD AUTO: 16.1 %
MAN DIFF?: NORMAL
MCHC RBC-ENTMCNC: 22.9 PG
MCHC RBC-ENTMCNC: 28.8 GM/DL
MCV RBC AUTO: 79.4 FL
MONOCYTES # BLD AUTO: 0.85 K/UL
MONOCYTES NFR BLD AUTO: 7.3 %
NEUTROPHILS # BLD AUTO: 8.6 K/UL
NEUTROPHILS NFR BLD AUTO: 73.9 %
PLATELET # BLD AUTO: 389 K/UL
RBC # BLD: 3.45 M/UL
RBC # FLD: 18.4 %
TIBC SERPL-MCNC: 394 UG/DL
UIBC SERPL-MCNC: 381 UG/DL
VIT B12 SERPL-MCNC: 1284 PG/ML
WBC # FLD AUTO: 11.63 K/UL

## 2022-10-25 NOTE — HISTORY OF PRESENT ILLNESS
[Patient reported PAP Smear was normal] : Patient reported PAP Smear was normal [Excessive Bleeding] : there was excessive bleeding [Normal Duration] : the duration was normal [Regular Cycle Intervals] : have been regular [Regular Duration] : has been regular [N] : Patient does not use contraception [Y] : Positive pregnancy history [FreeTextEntry1] : Pt has fibroids and Iron deficiency last ultrasound 11/24/2021\par LMP 10/01/22\par \par Ms. Wagoner has a known hx of fibroids and has subsequent menorrhgia resulting in anemia.  SHe has been reluctant to have surgery in the past but is now amenable.  Ms. Wagoner is under the care of Dr. Hines, Hematology.   [PapSmeardate] : 09/29/21 [LMPDate] : 10/01/22 [PGHxTotal] : 2 [[facility] Center[facility]ulerm] : 1 [[facility] Centeriving] : 1 [PGHxABInduced] : 1

## 2022-10-25 NOTE — PLAN
[FreeTextEntry1] : MRI for April, 2022 was reviewed with Ms. Wagoner.  I used drawings to help her to understand the position of the fibroids and how they cause heavy vaginal bleeding.  She verbalizes understanding.  Hysteroscopic resection vs abdominal myomectomy was discussed.  I explained that only the intrauterine portion of the fibroid can be resected via this minimally invasive approach.  Even after this procedure, additional surgery may be necessary as according to the MRI 20% intracavitary component.  The remaining portion can become symptomatic in the near future requiring additional surgery.  She verbalized understanding.  \par \par I also recommended that she see Dr. Conner Aguilar for consultation after repeating the MRI.  The prior MRI was done in April, 2022.

## 2022-10-25 NOTE — PHYSICAL EXAM
[Appropriately responsive] : appropriately responsive [Alert] : alert [No Acute Distress] : no acute distress [No Lymphadenopathy] : no lymphadenopathy [Regular Rate Rhythm] : regular rate rhythm [No Murmurs] : no murmurs [Clear to Auscultation B/L] : clear to auscultation bilaterally [Soft] : soft [Non-tender] : non-tender [Non-distended] : non-distended [No HSM] : No HSM [No Lesions] : no lesions [No Mass] : no mass [Oriented x3] : oriented x3 [Examination Of The Breasts] : a normal appearance [No Masses] : no breast masses were palpable [Labia Majora] : normal [Labia Minora] : normal [Normal] : normal [Tenderness] : nontender [Enlarged ___ wks] : enlarged [unfilled] ~Uweeks [Uterine Adnexae] : normal

## 2022-10-28 ENCOUNTER — OUTPATIENT (OUTPATIENT)
Dept: OUTPATIENT SERVICES | Facility: HOSPITAL | Age: 37
LOS: 1 days | End: 2022-10-28
Payer: COMMERCIAL

## 2022-10-28 ENCOUNTER — APPOINTMENT (OUTPATIENT)
Dept: INFUSION THERAPY | Facility: CLINIC | Age: 37
End: 2022-10-28

## 2022-10-28 VITALS
SYSTOLIC BLOOD PRESSURE: 107 MMHG | WEIGHT: 153 LBS | RESPIRATION RATE: 18 BRPM | HEIGHT: 63 IN | TEMPERATURE: 99 F | HEART RATE: 76 BPM | DIASTOLIC BLOOD PRESSURE: 66 MMHG | OXYGEN SATURATION: 99 %

## 2022-10-28 DIAGNOSIS — D50.9 IRON DEFICIENCY ANEMIA, UNSPECIFIED: ICD-10-CM

## 2022-10-28 PROCEDURE — 96365 THER/PROPH/DIAG IV INF INIT: CPT

## 2022-10-28 RX ORDER — FERUMOXYTOL 510 MG/17ML
510 INJECTION INTRAVENOUS ONCE
Refills: 0 | Status: COMPLETED | OUTPATIENT
Start: 2022-10-28 | End: 2022-10-28

## 2022-10-28 RX ADMIN — FERUMOXYTOL 510 MILLIGRAM(S): 510 INJECTION INTRAVENOUS at 13:25

## 2022-10-28 RX ADMIN — FERUMOXYTOL 117 MILLIGRAM(S): 510 INJECTION INTRAVENOUS at 12:25

## 2022-11-03 ENCOUNTER — NON-APPOINTMENT (OUTPATIENT)
Age: 37
End: 2022-11-03

## 2022-11-04 ENCOUNTER — APPOINTMENT (OUTPATIENT)
Dept: INFUSION THERAPY | Facility: CLINIC | Age: 37
End: 2022-11-04

## 2022-11-08 ENCOUNTER — APPOINTMENT (OUTPATIENT)
Dept: INFUSION THERAPY | Facility: CLINIC | Age: 37
End: 2022-11-08

## 2022-11-08 ENCOUNTER — APPOINTMENT (OUTPATIENT)
Dept: HEMATOLOGY ONCOLOGY | Facility: CLINIC | Age: 37
End: 2022-11-08

## 2022-11-08 ENCOUNTER — OUTPATIENT (OUTPATIENT)
Dept: OUTPATIENT SERVICES | Facility: HOSPITAL | Age: 37
LOS: 1 days | End: 2022-11-08
Payer: COMMERCIAL

## 2022-11-08 VITALS
RESPIRATION RATE: 17 BRPM | HEIGHT: 63 IN | TEMPERATURE: 98 F | WEIGHT: 153 LBS | HEART RATE: 85 BPM | SYSTOLIC BLOOD PRESSURE: 117 MMHG | OXYGEN SATURATION: 99 % | DIASTOLIC BLOOD PRESSURE: 72 MMHG

## 2022-11-08 DIAGNOSIS — D50.9 IRON DEFICIENCY ANEMIA, UNSPECIFIED: ICD-10-CM

## 2022-11-08 PROCEDURE — 96365 THER/PROPH/DIAG IV INF INIT: CPT

## 2022-11-08 RX ORDER — FERUMOXYTOL 510 MG/17ML
510 INJECTION INTRAVENOUS ONCE
Refills: 0 | Status: COMPLETED | OUTPATIENT
Start: 2022-11-08 | End: 2022-11-08

## 2022-11-08 RX ADMIN — FERUMOXYTOL 117 MILLIGRAM(S): 510 INJECTION INTRAVENOUS at 10:26

## 2022-11-08 RX ADMIN — FERUMOXYTOL 510 MILLIGRAM(S): 510 INJECTION INTRAVENOUS at 11:26

## 2022-11-14 ENCOUNTER — TRANSCRIPTION ENCOUNTER (OUTPATIENT)
Age: 37
End: 2022-11-14

## 2022-12-07 NOTE — PATIENT PROFILE OB - BREASTFEEDING PROVIDES STABLE TEMPERATURE THROUGH SKIN TO SKIN CONTACT
HISTORY & PHYSICAL PRIOR TO GI (GASTROINTESTINAL) PROCEDURE      HISTORY OF PRESENT ILLNESS:  73 year old female with history of dysphagia    MEDICAL HISTORY  Significant medical/surgical history:   Past Medical History:   Diagnosis Date   • Allergic rhinitis, cause unspecified per 5/12/05 Dr. Magill consult    Seasonal allergies   • Arthritis    • Essential (primary) hypertension    • Fibrocystic Breast Disease Mammo 1/14/08, 1/3/07,12/14/05, 12/31/04,     Dense parenchymal pattern--stable   • Goiter, specified as simple Nodule 1984    Partial Thyroidectomy   • Hemorrhoids 03/08/2021    Internal   • Hx of colonoscopy 07/09/2015    Adenoma 5yr colonoscopy recall Dr Duque    • Jackhammer esophagus    • Menopausal Syndrome per 7/15/96 note   • Mitral valve insufficiency and aortic valve insufficiency exercise induced    Per 6/25/02 Endocrinology consult   • Osteoporosis, unspecified per 5/15/03 bone density study   • Personal history of colonic polyps 07/09/2015   • PONV (postoperative nausea and vomiting)     \"violently\" nauseated   • RAD (reactive airway disease)    • Retroverted and incarcerated gravid uterus, postpartum per 12/16/04 US   • Sinus tachycardia 09/2018    wore monitor for 2 weeks   • Unspecified congenital anomaly of cervix, vagina, 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 Gudino   • Cardiac catherization  2000    negative cath - done for SOB   • Cataract extraction, bilateral Bilateral 04/2022   • Colonoscopy  07/09/2015    5 yr recall, due to Hx/ Dr. Duque   • Colonoscopy diagnostic  11/14/2002    Normal colonoscopy, qualified by poor preparation   • Colonoscopy diagnostic  02/02/2010    Heme+ Stools, rectal prolapse, Pers Hx polyp, 5  year recall per dr molina   • Colonoscopy diagnostic  2021    Dr. Duque WNL 5yr recall due 3/2026   • Colonoscopy remove lesions by snare  10/12/2005    Benign colon mucosa with prominent lymphoid aggregate   • Colonoscopy remove lesions by snare  1998    Hyperplastic polyp, focal adenomatous change   • Colorec canc scrn,colonoscopy not hi risk  2015    Dr. Duque recall 2020   • Egd dilation of duod w/baloon  2021   • Fl video swallow  2022    Dr. Ramsey   • Flexible sigmoidoscopy bx  10/20/1998    Tubular adenoma polyp   • Joint replacement Right 2017    Hip   • Ligate fallopian tube  1977    Sterilization ligate Fallopian tubes   • Loop recorder implant  10/11/2018    no longer functioning 2022   • Mri neck  2002    Tyroid mass   • Thyroid surgery Bilateral     Left Total Right Partial   • Ultrasound pelvis limited  2004       Past Complications with Sedation/Anesthesia:  NA    Possible Pregnancy (Last Menstrual Period):  NA    Significant Family History:  Family History   Problem Relation Age of Onset   • Cancer Mother         Malignant brain tumor   • Heart disease Father          from heart attack       Social History     Socioeconomic History   • Marital status: /Civil Union     Spouse name: Shad   • Number of children: 2   • Years of education: Not on file   • Highest education level: Not on file   Occupational History   • Occupation: Retired Nurse     Comment: [employer]   • Occupation: [employer]    Tobacco Use   • Smoking status: Never Smoker   • Smokeless tobacco: Never Used   Vaping Use   • Vaping Use: never used   Substance and Sexual Activity   • Alcohol use: No   • Drug use: No   • Sexual activity: Not Currently     Partners: Male     Birth control/protection: Post-menopausal   Other Topics Concern   •  Service Not Asked   • Blood Transfusions Not Asked   • Caffeine Concern Not Asked   • Occupational Exposure Not Asked   • Hobby  Hazards Not Asked   • Sleep Concern Not Asked   • Stress Concern Not Asked   • Weight Concern Not Asked   • Special Diet Not Asked   • Back Care Not Asked   • Exercise Not Asked   • Bike Helmet Not Asked   • Seat Belt Yes   • Self-Exams Not Asked   Social History Narrative   • Not on file     Social Determinants of Health     Financial Resource Strain: Not on file   Food Insecurity: Not on file   Transportation Needs: Not on file   Physical Activity: Not on file   Stress: Not on file   Social Connections: Not on file   Intimate Partner Violence: Not At Risk   • Social Determinants: Intimate Partner Violence Past Fear: No   • Social Determinants: Intimate Partner Violence Current Fear: No       ALLERGIES:   Allergen Reactions   • Tobramycin-Dexamethasone THROAT SWELLING     Ophthamologic solution   • Vancomycin SWELLING and Other (See Comments)     On the mouth.  Hearing effect    • Levaquin Dermatitis and Other (See Comments)     halucinations   • Tape [Adhesive   (Environmental)] RASH       Current Outpatient Medications   Medication Sig Dispense Refill   • propafenone (RYTHMOL) 225 MG tablet Take 1 tablet by mouth 2 times daily. 180 tablet 1   • amLODIPine (NORVASC) 2.5 MG tablet Take 1 tablet by mouth daily. 90 tablet 1   • Loteprednol Etabonate (Lotemax SM) 0.38 % Gel Instill 1 drop 3 times daily into right eye for 1 week, then twice daily for 1 week then daily for 1 week 5 g 1   • levothyroxine 50 MCG tablet Take 1 tablet by mouth daily. 90 tablet 1   • Omega-3 Fatty Acids (Fish Oil) 1200 MG capsule Take 1,200 mg by mouth 2 times daily.     • Multiple Vitamins-Minerals (PreserVision AREDS 2) Cap Takes 1 Daily     • propylene glycol (Systane Complete) 0.6 % Solution Apply 1 drop to eye 2 times daily as needed (dry eyes). 10 mL    • amoxicillin (AMOXIL) 500 MG capsule Take 4 capsules by mouth daily as needed (dental prophylaxis). One hour prior to procedure 16 capsule 3   • calcium citrate-vitamin D (CITRACAL+D)  315-250 MG-UNIT per tablet Take 1 tablet by mouth 2 times daily. 500 MG daily     • Hyoscyamine Sulfate SL (Levsin/SL) 0.125 MG SL Tab Place 1 tablet (0.125 mg) under the tongue every 8 hours as needed (Chest Pain/Swallowing Difficulty). 30 tablet 1   • estradiol 0.2 mg/g (0.02 %) cream (in natural base) Apply 1 gram (1 mL) into the vagina nightly for 2 weeks, then decrease to 2-3 times per week at night. 30 g 11   • fexofenadine (ALLEGRA) 180 MG tablet Take 1 tablet by mouth daily.  0   • polyethylene glycol (MIRALAX) powder Take 17 g by mouth daily. 255 g 1   • MULTIVITAMINS PO TABS 1 TABLET DAILY 0 0     Current Facility-Administered Medications   Medication Dose Route Frequency Provider Last Rate Last Admin   • sodium chloride 0.9 % flush bag 25 mL  25 mL Intravenous PRN Alen Ramsey MD       • sodium chloride (PF) 0.9 % injection 2 mL  2 mL Intracatheter 2 times per day Alen Ramsey MD       • lidocaine (XYLOCAINE) 4 % topical solution   Oral On Call to Procedure Alen Ramsey MD        And   • simethicone (MYLICON) 40 MG/0.6ML drops 333 mg  333 mg Oral On Call to Procedure Alen Ramsey MD       • sodium chloride 0.9% infusion   Intravenous Continuous Alen Ramsey MD       • lidocaine (ANECREAM/LMX) 4 % cream 1 application  1 application Topical PRN Sanjay Mendez MD       • lidocaine HCl (PF) (XYLOCAINE) 1 % injection 10 mg  10 mg Subcutaneous PRN Sajnay Mendez MD       • dextrose (GLUTOSE) 40 % gel 30 g  30 g Oral Once PRN Sanjay Mendez MD       • dextrose 50 % injection 25 g  25 g Intravenous PRN Sanjay Mendez MD       • insulin regular (human) (HumuLIN R, NovoLIN R) sliding scale injection   Subcutaneous Once PRN Sanjay Mendez MD       • sodium chloride 0.9 % flush bag 25 mL  25 mL Intravenous PRN Sanjay Mendez MD       • sodium chloride (PF) 0.9 % injection 2 mL  2 mL Intracatheter 2 times per day Sanjay Mendez MD       • lactated ringers infusion   Intravenous Continuous  Sanjay Mendez MD       • sodium chloride 0.9% infusion   Intravenous Continuous Sanjay Mendez MD 10 mL/hr at 12/07/22 0700 New Bag at 12/07/22 0700       REVIEW OF SYSTEMS:  A complete review of systems was performed and found to be negative except for what was stated in the HPI (History of Present Illness).    PHYSICAL EXAM:  Vitals:    Visit Vitals  BP (!) 141/68 (BP Location: RUE - Right upper extremity)   Pulse 62   Temp 97 °F (36.1 °C) (Temporal)   Resp 16   Ht 5' 3\" (1.6 m)   Wt 59.3 kg (130 lb 11.7 oz)   LMP  (LMP Unknown)   SpO2 99%   BMI 23.16 kg/m²      Constitutional:  Alert and oriented x3, NAD (no acute distress).  Skin:  No jaundice.  Skin is warm and dry on palpation.  Eyes:  PERRL (pupils equal, round, and reactive to light), EOMI (extraocular movements are intact).  Normal conjunctivae and lids, no scleral icterus.  Pulmonary:  Clear to auscultation bilaterally.  Cardiovascular:  S1 and S2, no murmur, regular rate on auscultation.   Abdomen:  Soft, nontender, no distention, bowel sounds present.  No hepatosplenomegaly, fullness, guarding or rebound noted.  Musculoskeletal:  Patient moving all extremities appropriately.  No cyanosis, clubbing or edema noted.   Neurologic:  Grossly nonfocal, CN (cranial nerves) 2-12 grossly intact, detailed neurological exam was not performed.     Assessment and Plan:  Patient was advised of the risks, complications, and benefits of the procedure including but not limited to bleeding, perforation, medication reaction, infection, and surgery.  The patient was advised of the alternatives of the procedures, as well as the possibility that a small cancerous polyp or tumor could be missed.  The patient does understand and agrees to the procedure.     Patient is a candidate for planned procedure EGD for dysphagia    Plan for Sedation:  ALOK Ramsey MD  12/7/2022   Statement Selected

## 2022-12-12 ENCOUNTER — APPOINTMENT (OUTPATIENT)
Dept: HEMATOLOGY ONCOLOGY | Facility: CLINIC | Age: 37
End: 2022-12-12

## 2022-12-13 ENCOUNTER — APPOINTMENT (OUTPATIENT)
Dept: HEMATOLOGY ONCOLOGY | Facility: CLINIC | Age: 37
End: 2022-12-13

## 2022-12-13 ENCOUNTER — APPOINTMENT (OUTPATIENT)
Dept: OBGYN | Facility: CLINIC | Age: 37
End: 2022-12-13

## 2022-12-13 VITALS
OXYGEN SATURATION: 97 % | HEIGHT: 63 IN | BODY MASS INDEX: 27.46 KG/M2 | WEIGHT: 155 LBS | HEART RATE: 85 BPM | DIASTOLIC BLOOD PRESSURE: 69 MMHG | SYSTOLIC BLOOD PRESSURE: 108 MMHG | TEMPERATURE: 97.7 F

## 2022-12-13 PROCEDURE — 81025 URINE PREGNANCY TEST: CPT

## 2022-12-13 PROCEDURE — 99212 OFFICE O/P EST SF 10 MIN: CPT

## 2022-12-13 PROCEDURE — 99213 OFFICE O/P EST LOW 20 MIN: CPT | Mod: 25

## 2022-12-13 PROCEDURE — 36415 COLL VENOUS BLD VENIPUNCTURE: CPT

## 2022-12-13 NOTE — PLAN
[FreeTextEntry1] : Urine culture sent to ensure infection has resolved. As this is patient's first abnormal menses interval, she is going to follow up with Dr. Aguilar regarding her fibroids and contact office if it occurs again next month. \par \par RECALL PRN

## 2022-12-13 NOTE — HISTORY OF PRESENT ILLNESS
[FreeTextEntry1] : Patient presents to office with complaints of urinary frequency and that her period started two weeks late. \par \par UPT in office negative. \par Patient reports she was recently treated for a UTI and is unsure if it has completely resolved.

## 2022-12-14 ENCOUNTER — TRANSCRIPTION ENCOUNTER (OUTPATIENT)
Age: 37
End: 2022-12-14

## 2022-12-14 LAB
ERYTHROCYTE [SEDIMENTATION RATE] IN BLOOD BY WESTERGREN METHOD: 61 MM/HR
FERRITIN SERPL-MCNC: 145 NG/ML
HAPTOGLOB SERPL-MCNC: 202 MG/DL
IRON SATN MFR SERPL: 13 %
IRON SERPL-MCNC: 38 UG/DL
LDH SERPL-CCNC: 181 U/L
TIBC SERPL-MCNC: 302 UG/DL
UIBC SERPL-MCNC: 264 UG/DL

## 2022-12-14 NOTE — HISTORY OF PRESENT ILLNESS
[de-identified] : 37 years old female was found to have anemia, known at least since 2017... In 2017 she had proven iron deficiency state... No recent iron stores are available... Patient states her periods are heavy because of fibroids... No excessive bleed with her delivery... She only currently takes p.o. iron... [de-identified] : June 13, 2022 patient returns for follow-up she has been taking oral iron...\par \par July 11, 2022 returns for follow-up after having received IV iron x2... Tolerated well... Reluctantly admitting that she feels better...\par \par October 13, 2022 patient requested follow-up appointment since her hemoglobin is down to 7 g/dL again... She did not have a procedure with Dr. Wagoner yet... Patient states she is feeling fine...\par \par December 13, 2022 returns for follow-up after having received IV iron... On November 9 she was seen in the emergency room at [facility] where her hemoglobin was 9.7 g/dL... She saw Dr. Aguilar in GYN consultation for possible myomectomy

## 2022-12-14 NOTE — ASSESSMENT
[FreeTextEntry1] : Since patient states she is feeling fine.\par \par Repeat blood work patient's hemoglobin is back to normal 12 g/dL, she is therefore hematologically cleared for procedure indicated.\par \par Patient already increased her p.o. iron to 1 tablet 3 times a day, and I encouraged her to continue that.\par \par \par  Dr. Aguilar was informed...

## 2022-12-14 NOTE — CONSULT LETTER
[Consult Letter:] : I had the pleasure of evaluating your patient, [unfilled]. [Please see my note below.] : Please see my note below. [Consult Closing:] : Thank you very much for allowing me to participate in the care of this patient.  If you have any questions, please do not hesitate to contact me. [Sincerely,] : Sincerely, [Dear  ___] : Dear  [unfilled], [FreeTextEntry3] : Brisa Hines MD\par

## 2022-12-15 LAB — BACTERIA UR CULT: NORMAL

## 2022-12-16 LAB
BASOPHILS # BLD AUTO: 0.02 K/UL
BASOPHILS NFR BLD AUTO: 0.2 %
EOSINOPHIL # BLD AUTO: 0.11 K/UL
EOSINOPHIL NFR BLD AUTO: 1 %
HCT VFR BLD CALC: 39.1 %
HGB BLD-MCNC: 12.2 G/DL
IMM GRANULOCYTES NFR BLD AUTO: 0.3 %
LYMPHOCYTES # BLD AUTO: 1.26 K/UL
LYMPHOCYTES NFR BLD AUTO: 11.8 %
MAN DIFF?: NORMAL
MCHC RBC-ENTMCNC: 26.3 PG
MCHC RBC-ENTMCNC: 31.2 GM/DL
MCV RBC AUTO: 84.4 FL
MONOCYTES # BLD AUTO: 0.59 K/UL
MONOCYTES NFR BLD AUTO: 5.5 %
NEUTROPHILS # BLD AUTO: 8.7 K/UL
NEUTROPHILS NFR BLD AUTO: 81.2 %
PLATELET # BLD AUTO: 314 K/UL
RBC # BLD: 4.63 M/UL
RBC # FLD: 21 %
WBC # FLD AUTO: 10.71 K/UL

## 2022-12-27 ENCOUNTER — APPOINTMENT (OUTPATIENT)
Dept: OBGYN | Facility: CLINIC | Age: 37
End: 2022-12-27
Payer: COMMERCIAL

## 2022-12-27 VITALS
OXYGEN SATURATION: 98 % | HEIGHT: 63 IN | BODY MASS INDEX: 27.11 KG/M2 | SYSTOLIC BLOOD PRESSURE: 114 MMHG | HEART RATE: 81 BPM | DIASTOLIC BLOOD PRESSURE: 75 MMHG | WEIGHT: 153 LBS

## 2022-12-27 PROCEDURE — 99215 OFFICE O/P EST HI 40 MIN: CPT

## 2023-02-09 NOTE — CONSULT LETTER
[Dear  ___] : Dear  [unfilled], [Consult Letter:] : I had the pleasure of evaluating your patient, [unfilled]. [( Thank you for referring [unfilled] for consultation for _____ )] : Thank you for referring [unfilled] for consultation for [unfilled] [Please see my note below.] : Please see my note below. [Consult Closing:] : Thank you very much for allowing me to participate in the care of this patient.  If you have any questions, please do not hesitate to contact me. [Sincerely,] : Sincerely, [FreeTextEntry2] : Dr. Heidy Wagoner \par [address] [place] [address] \[place] [address]  [FreeTextEntry3] : Conner Aguilar MD, FACOG, FACS \par Minimally Invasive Gynecologic Surgery \par [facility] \par [address] [place] \[place] [address] \par Tel: [phone] \par Fax: [phone]\par

## 2023-02-09 NOTE — HISTORY OF PRESENT ILLNESS
[Patient reported PAP Smear was normal] : Patient reported PAP Smear was normal [N] : Patient does not use contraception [Y] : Positive pregnancy history [Regular Cycle Intervals] : periods have been regular [Frequency: Q ___ days] : menstrual periods occur approximately every [unfilled] days [Menarche Age: ____] : age at menarche was [unfilled] [No] : Patient does not have concerns regarding sex [Currently Active] : currently active [Men] : men [Vaginal] : vaginal [PapSmeardate] : 10/22 [LMPDate] : 12/12/22 [MensesFreq] : 28 [MensesLength] : 5 [PGHxTotal] : 2 [PGHxAbortions] : 1 [[facility] Center[facility]] : 1 [FreeTextEntry1] : 12/12/22

## 2023-02-09 NOTE — DISCUSSION/SUMMARY
[FreeTextEntry1] : I sat down with the patient to discuss the imaging findings & her symptoms which warrant surgical intervention.  We looked at the MRI together.  I explained that this is an elective procedure and she may not want to have myoma removed because it delays fertility, she will have to wait 4-6 months after the surgery before she can attempt pregnancy.  There is no evidence to show that presence of uterine myoma decrease fertility unless they are submucosal or blocking the opening of fallopian tubes. She may have pain and  contractions during pregnancy from a myoma with possible degeneration. Discussion about management options uterine myoma including uterine artery embolization and radiofreqency destruction of myoma (both not recommended if planning pregnancy) and myomectomy.  In general, uterine sarcoma is rare 1/500 and difficult to diagnose without pathological evaluation of myoma.  Theses have been long standing myomata and malignancy is unlikely.  The options for surgical approach including open, vaginal, and laparoscopic with or without robotic assistance were discussed she would like to proceed with laparoscopic or robotic myomectomy. \par \par The differential diagnosis was discussed in detail. The indications, risks, benefits and alternatives were discussed. Including but not limited to, conversion to laparotomy, bleeding, infection, injury to surrounding organs was discussed at length.  Rare chance of hysterectomy.  Chance of occult injury and need for future surgery, fistula formation. JENI CABRALES The substantial increase in risks due to her body habitus including, but not limited to, heightened surgical difficulty due to compromised intraoperative exposure and increased operative time, was explicitly discussed at length. JENI CABRALES  expressed an understanding of the treatment rationale and her questions were answered to her apparent satisfaction.  She was given written postoperative instructions and diagram of the pelvic with relevant surrounding anatomy. \par \par -schedule surgery \par \par

## 2023-02-27 ENCOUNTER — APPOINTMENT (OUTPATIENT)
Dept: HEMATOLOGY ONCOLOGY | Facility: CLINIC | Age: 38
End: 2023-02-27
Payer: COMMERCIAL

## 2023-02-27 VITALS
BODY MASS INDEX: 27.46 KG/M2 | HEART RATE: 94 BPM | SYSTOLIC BLOOD PRESSURE: 116 MMHG | DIASTOLIC BLOOD PRESSURE: 81 MMHG | TEMPERATURE: 97.1 F | HEIGHT: 63 IN | OXYGEN SATURATION: 100 % | WEIGHT: 155 LBS

## 2023-02-27 DIAGNOSIS — N93.9 ABNORMAL UTERINE AND VAGINAL BLEEDING, UNSPECIFIED: ICD-10-CM

## 2023-02-27 PROCEDURE — 36415 COLL VENOUS BLD VENIPUNCTURE: CPT

## 2023-02-27 PROCEDURE — 99213 OFFICE O/P EST LOW 20 MIN: CPT | Mod: 25

## 2023-02-28 NOTE — ASSESSMENT
[FreeTextEntry1] : Repeat Blood Work Done..  Patient had her menses during her visit... Repeat hemoglobin is 10.4 g/dL patient has large amount of RBCs in the urine....\par \par Patient is hematologically cleared for procedure indicated.

## 2023-02-28 NOTE — HISTORY OF PRESENT ILLNESS
[de-identified] : 37 years old female was found to have anemia, known at least since 2017... In 2017 she had proven iron deficiency state... No recent iron stores are available... Patient states her periods are heavy because of fibroids... No excessive bleed with her delivery... She only currently takes p.o. iron... [de-identified] : June 13, 2022 patient returns for follow-up she has been taking oral iron...\par \par July 11, 2022 returns for follow-up after having received IV iron x2... Tolerated well... Reluctantly admitting that she feels better...\par \par October 13, 2022 patient requested follow-up appointment since her hemoglobin is down to 7 g/dL again... She did not have a procedure with Dr. Wagoner yet... Patient states she is feeling fine...\par \par December 13, 2022 returns for follow-up after having received IV iron... On November 9 she was seen in the emergency room at [facility] where her hemoglobin was 9.7 g/dL... She saw Dr. Aguilar in GYN consultation for possible myomectomy\par \par February 27, 2023 Needed new hematological clearance prior to gynecological procedure...

## 2023-03-02 LAB
ABO + RH PNL BLD: NORMAL
ALBUMIN SERPL ELPH-MCNC: 4.2 G/DL
ALP BLD-CCNC: 49 U/L
ALT SERPL-CCNC: 15 U/L
ANION GAP SERPL CALC-SCNC: 16 MMOL/L
APPEARANCE: ABNORMAL
APTT BLD: 31.2 SEC
AST SERPL-CCNC: 15 U/L
BACTERIA: NEGATIVE
BASOPHILS # BLD AUTO: 0.04 K/UL
BASOPHILS NFR BLD AUTO: 0.4 %
BILIRUB SERPL-MCNC: <0.2 MG/DL
BILIRUBIN URINE: NEGATIVE
BLOOD URINE: ABNORMAL
BUN SERPL-MCNC: 10 MG/DL
CALCIUM SERPL-MCNC: 9.5 MG/DL
CHLORIDE SERPL-SCNC: 104 MMOL/L
CO2 SERPL-SCNC: 22 MMOL/L
COLOR: ABNORMAL
CREAT SERPL-MCNC: 0.81 MG/DL
EGFR: 95 ML/MIN/1.73M2
EOSINOPHIL # BLD AUTO: 0.14 K/UL
EOSINOPHIL NFR BLD AUTO: 1.5 %
ERYTHROCYTE [SEDIMENTATION RATE] IN BLOOD BY WESTERGREN METHOD: 7 MM/HR
FERRITIN SERPL-MCNC: 38 NG/ML
GLUCOSE QUALITATIVE U: NEGATIVE
GLUCOSE SERPL-MCNC: 104 MG/DL
HCG SERPL-MCNC: <1 MIU/ML
HCT VFR BLD CALC: 34.7 %
HGB BLD-MCNC: 10.4 G/DL
HYALINE CASTS: 0 /LPF
IMM GRANULOCYTES NFR BLD AUTO: 0.2 %
INR PPP: 1.03 RATIO
IRON SATN MFR SERPL: 17 %
IRON SERPL-MCNC: 58 UG/DL
KETONES URINE: NEGATIVE
LDH SERPL-CCNC: 221 U/L
LEUKOCYTE ESTERASE URINE: NEGATIVE
LYMPHOCYTES # BLD AUTO: 1.32 K/UL
LYMPHOCYTES NFR BLD AUTO: 13.7 %
MAN DIFF?: NORMAL
MCHC RBC-ENTMCNC: 27.7 PG
MCHC RBC-ENTMCNC: 30 GM/DL
MCV RBC AUTO: 92.5 FL
MICROSCOPIC-UA: NORMAL
MONOCYTES # BLD AUTO: 0.53 K/UL
MONOCYTES NFR BLD AUTO: 5.5 %
NEUTROPHILS # BLD AUTO: 7.6 K/UL
NEUTROPHILS NFR BLD AUTO: 78.7 %
NITRITE URINE: NEGATIVE
PH URINE: 7
PLATELET # BLD AUTO: 361 K/UL
POTASSIUM SERPL-SCNC: 4.1 MMOL/L
PROT SERPL-MCNC: 6.8 G/DL
PROTEIN URINE: ABNORMAL
PT BLD: 12 SEC
RBC # BLD: 3.75 M/UL
RBC # FLD: 15 %
RED BLOOD CELLS URINE: >720 /HPF
SODIUM SERPL-SCNC: 142 MMOL/L
SPECIFIC GRAVITY URINE: 1.02
SQUAMOUS EPITHELIAL CELLS: 4 /HPF
TIBC SERPL-MCNC: 337 UG/DL
UIBC SERPL-MCNC: 279 UG/DL
UROBILINOGEN URINE: NORMAL
WBC # FLD AUTO: 9.65 K/UL
WHITE BLOOD CELLS URINE: 5 /HPF

## 2023-03-07 ENCOUNTER — APPOINTMENT (OUTPATIENT)
Dept: OBGYN | Facility: CLINIC | Age: 38
End: 2023-03-07
Payer: COMMERCIAL

## 2023-03-07 VITALS — OXYGEN SATURATION: 100 % | HEIGHT: 63 IN | HEART RATE: 86 BPM

## 2023-03-07 DIAGNOSIS — D50.9 IRON DEFICIENCY ANEMIA, UNSPECIFIED: ICD-10-CM

## 2023-03-07 PROCEDURE — 99214 OFFICE O/P EST MOD 30 MIN: CPT

## 2023-03-07 RX ORDER — OXYCODONE 5 MG/1
5 TABLET ORAL
Qty: 5 | Refills: 0 | Status: ACTIVE | COMMUNITY
Start: 2023-03-07 | End: 1900-01-01

## 2023-03-07 RX ORDER — NAPROXEN 500 MG/1
500 TABLET ORAL
Qty: 1 | Refills: 2 | Status: ACTIVE | COMMUNITY
Start: 2023-03-07 | End: 1900-01-01

## 2023-03-07 NOTE — HISTORY OF PRESENT ILLNESS
[FreeTextEntry1] : 38 yo patient presents for pre surgical consultation for laparoscopic myomectomy scheduled for 3/13/23.

## 2023-03-07 NOTE — DISCUSSION/SUMMARY
[FreeTextEntry1] : JENI CABRALES 36 yo presents for follow-up prior to surgery. The associated risks, benefits, alternatives of the procedure have been outlined discussed and reviewed with the patient. These risks including but not limited to bleeding, infection, injury to adjacent organs, need for secondary surgery, incisional dehiscence, incisional hernia, change in bowel habits, change in urinary function, nerve injury, change in sexual function/impaired fertility, as well as the risk of heart and lung complications, stroke, DVT/PE and death were detailed. \par \par The possibility of recurrent or continued symptoms, requiring continued medical management despite surgical intervention, was discussed.\par \par We reviewed pain management, diet, enhanced recovery protocol. Medication prescribed.

## 2023-03-10 VITALS
HEART RATE: 85 BPM | SYSTOLIC BLOOD PRESSURE: 128 MMHG | OXYGEN SATURATION: 98 % | RESPIRATION RATE: 16 BRPM | WEIGHT: 153.22 LBS | TEMPERATURE: 98 F | HEIGHT: 63 IN | DIASTOLIC BLOOD PRESSURE: 81 MMHG

## 2023-03-10 NOTE — ASU PATIENT PROFILE, ADULT - FALL HARM RISK - HARM RISK INTERVENTIONS
Cell Phone/PDA (specify)/pocketbook/Clothing/Other belongings
Communicate Risk of Fall with Harm to all staff/Reinforce activity limits and safety measures with patient and family/Tailored Fall Risk Interventions/Visual Cue: Yellow wristband and red socks/Bed in lowest position, wheels locked, appropriate side rails in place/Call bell, personal items and telephone in reach/Instruct patient to call for assistance before getting out of bed or chair/Non-slip footwear when patient is out of bed/[place] to call system/Physically safe environment - no spills, clutter or unnecessary equipment/Purposeful Proactive Rounding/Room/bathroom lighting operational, light cord in reach

## 2023-03-10 NOTE — ASU PATIENT PROFILE, ADULT - NSICDXPASTMEDICALHX_GEN_ALL_CORE_FT
PAST MEDICAL HISTORY:  Elevated blood pressure, situational     Endometrial polyp     Enlarged uterus     Fibroids     GERD (gastroesophageal reflux disease)     History of palpitations     History of pre-eclampsia in postpartum period    History of sinus tachycardia     History of urinary frequency     Iron deficiency anemia

## 2023-03-12 ENCOUNTER — TRANSCRIPTION ENCOUNTER (OUTPATIENT)
Age: 38
End: 2023-03-12

## 2023-03-12 NOTE — PRE-ANESTHESIA EVALUATION ADULT - NSANTHOSAYNRD_GEN_A_CORE
No. ALEKSANDR screening performed.  STOP BANG Legend: 0-2 = LOW Risk; 3-4 = INTERMEDIATE Risk; 5-8 = HIGH Risk

## 2023-03-13 ENCOUNTER — APPOINTMENT (OUTPATIENT)
Dept: OBGYN | Facility: HOSPITAL | Age: 38
End: 2023-03-13

## 2023-03-13 ENCOUNTER — INPATIENT (INPATIENT)
Facility: HOSPITAL | Age: 38
LOS: 0 days | Discharge: HOME | DRG: 743 | End: 2023-03-14
Attending: OBSTETRICS & GYNECOLOGY | Admitting: OBSTETRICS & GYNECOLOGY
Payer: COMMERCIAL

## 2023-03-13 ENCOUNTER — TRANSCRIPTION ENCOUNTER (OUTPATIENT)
Age: 38
End: 2023-03-13

## 2023-03-13 ENCOUNTER — RESULT REVIEW (OUTPATIENT)
Age: 38
End: 2023-03-13

## 2023-03-13 DIAGNOSIS — Z98.891 HISTORY OF UTERINE SCAR FROM PREVIOUS SURGERY: Chronic | ICD-10-CM

## 2023-03-13 LAB
ANISOCYTOSIS BLD QL: SLIGHT — SIGNIFICANT CHANGE UP
BASOPHILS # BLD AUTO: 0 K/UL — SIGNIFICANT CHANGE UP (ref 0–0.2)
BASOPHILS NFR BLD AUTO: 0 % — SIGNIFICANT CHANGE UP (ref 0–2)
BLD GP AB SCN SERPL QL: NEGATIVE — SIGNIFICANT CHANGE UP
EOSINOPHIL # BLD AUTO: 0 K/UL — SIGNIFICANT CHANGE UP (ref 0–0.5)
EOSINOPHIL NFR BLD AUTO: 0 % — SIGNIFICANT CHANGE UP (ref 0–6)
GIANT PLATELETS BLD QL SMEAR: PRESENT — SIGNIFICANT CHANGE UP
HCT VFR BLD CALC: 35.8 % — SIGNIFICANT CHANGE UP (ref 34.5–45)
HGB BLD-MCNC: 11.2 G/DL — LOW (ref 11.5–15.5)
LYMPHOCYTES # BLD AUTO: 0 % — LOW (ref 13–44)
LYMPHOCYTES # BLD AUTO: 0 K/UL — LOW (ref 1–3.3)
MACROCYTES BLD QL: SIGNIFICANT CHANGE UP
MANUAL SMEAR VERIFICATION: SIGNIFICANT CHANGE UP
MCHC RBC-ENTMCNC: 28.8 PG — SIGNIFICANT CHANGE UP (ref 27–34)
MCHC RBC-ENTMCNC: 31.3 GM/DL — LOW (ref 32–36)
MCV RBC AUTO: 92 FL — SIGNIFICANT CHANGE UP (ref 80–100)
MONOCYTES # BLD AUTO: 0.12 K/UL — SIGNIFICANT CHANGE UP (ref 0–0.9)
MONOCYTES NFR BLD AUTO: 0.8 % — LOW (ref 2–14)
NEUTROPHILS # BLD AUTO: 14.63 K/UL — HIGH (ref 1.8–7.4)
NEUTROPHILS NFR BLD AUTO: 98.3 % — HIGH (ref 43–77)
NEUTS BAND # BLD: 0.9 % — SIGNIFICANT CHANGE UP (ref 0–8)
PLAT MORPH BLD: NORMAL — SIGNIFICANT CHANGE UP
PLATELET # BLD AUTO: 204 K/UL — SIGNIFICANT CHANGE UP (ref 150–400)
RBC # BLD: 3.89 M/UL — SIGNIFICANT CHANGE UP (ref 3.8–5.2)
RBC # FLD: 14.5 % — SIGNIFICANT CHANGE UP (ref 10.3–14.5)
RBC BLD AUTO: SIGNIFICANT CHANGE UP
RH IG SCN BLD-IMP: NEGATIVE — SIGNIFICANT CHANGE UP
SPHEROCYTES BLD QL SMEAR: SIGNIFICANT CHANGE UP
WBC # BLD: 14.75 K/UL — HIGH (ref 3.8–10.5)
WBC # FLD AUTO: 14.75 K/UL — HIGH (ref 3.8–10.5)

## 2023-03-13 PROCEDURE — 58546 LAPARO-MYOMECTOMY COMPLEX: CPT

## 2023-03-13 PROCEDURE — 88305 TISSUE EXAM BY PATHOLOGIST: CPT | Mod: 26

## 2023-03-13 DEVICE — SURGICEL POWDER 3 GRAMS: Type: IMPLANTABLE DEVICE | Status: FUNCTIONAL

## 2023-03-13 DEVICE — INTERCEED 3 X 4": Type: IMPLANTABLE DEVICE | Status: FUNCTIONAL

## 2023-03-13 RX ORDER — CELECOXIB 200 MG/1
400 CAPSULE ORAL ONCE
Refills: 0 | Status: COMPLETED | OUTPATIENT
Start: 2023-03-13 | End: 2023-03-13

## 2023-03-13 RX ORDER — GABAPENTIN 400 MG/1
300 CAPSULE ORAL ONCE
Refills: 0 | Status: COMPLETED | OUTPATIENT
Start: 2023-03-13 | End: 2023-03-13

## 2023-03-13 RX ORDER — HEPARIN SODIUM 5000 [USP'U]/ML
5000 INJECTION INTRAVENOUS; SUBCUTANEOUS ONCE
Refills: 0 | Status: COMPLETED | OUTPATIENT
Start: 2023-03-13 | End: 2023-03-13

## 2023-03-13 RX ORDER — ACETAMINOPHEN 500 MG
1000 TABLET ORAL ONCE
Refills: 0 | Status: COMPLETED | OUTPATIENT
Start: 2023-03-13 | End: 2023-03-13

## 2023-03-13 RX ORDER — OXYCODONE HYDROCHLORIDE 5 MG/1
5 TABLET ORAL EVERY 4 HOURS
Refills: 0 | Status: DISCONTINUED | OUTPATIENT
Start: 2023-03-13 | End: 2023-03-14

## 2023-03-13 RX ORDER — SIMETHICONE 80 MG/1
80 TABLET, CHEWABLE ORAL EVERY 6 HOURS
Refills: 0 | Status: DISCONTINUED | OUTPATIENT
Start: 2023-03-13 | End: 2023-03-14

## 2023-03-13 RX ORDER — ACETAMINOPHEN 500 MG
2 TABLET ORAL
Qty: 0 | Refills: 0 | DISCHARGE
Start: 2023-03-13

## 2023-03-13 RX ORDER — ACETAMINOPHEN 500 MG
1000 TABLET ORAL EVERY 6 HOURS
Refills: 0 | Status: DISCONTINUED | OUTPATIENT
Start: 2023-03-13 | End: 2023-03-14

## 2023-03-13 RX ORDER — ONDANSETRON 8 MG/1
8 TABLET, FILM COATED ORAL EVERY 8 HOURS
Refills: 0 | Status: DISCONTINUED | OUTPATIENT
Start: 2023-03-13 | End: 2023-03-14

## 2023-03-13 RX ORDER — METOCLOPRAMIDE HCL 10 MG
10 TABLET ORAL EVERY 8 HOURS
Refills: 0 | Status: DISCONTINUED | OUTPATIENT
Start: 2023-03-13 | End: 2023-03-14

## 2023-03-13 RX ORDER — SODIUM CHLORIDE 9 MG/ML
1000 INJECTION, SOLUTION INTRAVENOUS ONCE
Refills: 0 | Status: COMPLETED | OUTPATIENT
Start: 2023-03-13 | End: 2023-03-13

## 2023-03-13 RX ORDER — OXYCODONE HYDROCHLORIDE 5 MG/1
10 TABLET ORAL EVERY 4 HOURS
Refills: 0 | Status: DISCONTINUED | OUTPATIENT
Start: 2023-03-13 | End: 2023-03-14

## 2023-03-13 RX ORDER — SODIUM CHLORIDE 9 MG/ML
1000 INJECTION, SOLUTION INTRAVENOUS
Refills: 0 | Status: DISCONTINUED | OUTPATIENT
Start: 2023-03-13 | End: 2023-03-14

## 2023-03-13 RX ORDER — PANTOPRAZOLE SODIUM 20 MG/1
40 TABLET, DELAYED RELEASE ORAL
Refills: 0 | Status: DISCONTINUED | OUTPATIENT
Start: 2023-03-13 | End: 2023-03-14

## 2023-03-13 RX ORDER — SODIUM CHLORIDE 9 MG/ML
500 INJECTION, SOLUTION INTRAVENOUS
Refills: 0 | Status: DISCONTINUED | OUTPATIENT
Start: 2023-03-13 | End: 2023-03-13

## 2023-03-13 RX ORDER — KETOROLAC TROMETHAMINE 30 MG/ML
30 SYRINGE (ML) INJECTION EVERY 6 HOURS
Refills: 0 | Status: DISCONTINUED | OUTPATIENT
Start: 2023-03-13 | End: 2023-03-14

## 2023-03-13 RX ORDER — HYDROMORPHONE HYDROCHLORIDE 2 MG/ML
0.5 INJECTION INTRAMUSCULAR; INTRAVENOUS; SUBCUTANEOUS
Refills: 0 | Status: DISCONTINUED | OUTPATIENT
Start: 2023-03-13 | End: 2023-03-14

## 2023-03-13 RX ADMIN — Medication 10 MILLIGRAM(S): at 19:54

## 2023-03-13 RX ADMIN — ONDANSETRON 8 MILLIGRAM(S): 8 TABLET, FILM COATED ORAL at 18:57

## 2023-03-13 RX ADMIN — HYDROMORPHONE HYDROCHLORIDE 0.5 MILLIGRAM(S): 2 INJECTION INTRAMUSCULAR; INTRAVENOUS; SUBCUTANEOUS at 15:50

## 2023-03-13 RX ADMIN — Medication 400 MILLIGRAM(S): at 20:35

## 2023-03-13 RX ADMIN — Medication 30 MILLIGRAM(S): at 22:06

## 2023-03-13 RX ADMIN — HYDROMORPHONE HYDROCHLORIDE 0.5 MILLIGRAM(S): 2 INJECTION INTRAMUSCULAR; INTRAVENOUS; SUBCUTANEOUS at 16:19

## 2023-03-13 RX ADMIN — HYDROMORPHONE HYDROCHLORIDE 0.5 MILLIGRAM(S): 2 INJECTION INTRAMUSCULAR; INTRAVENOUS; SUBCUTANEOUS at 15:40

## 2023-03-13 RX ADMIN — Medication 30 MILLIGRAM(S): at 19:50

## 2023-03-13 RX ADMIN — SODIUM CHLORIDE 125 MILLILITER(S): 9 INJECTION, SOLUTION INTRAVENOUS at 15:25

## 2023-03-13 RX ADMIN — Medication 1000 MILLIGRAM(S): at 21:06

## 2023-03-13 RX ADMIN — CELECOXIB 400 MILLIGRAM(S): 200 CAPSULE ORAL at 11:00

## 2023-03-13 RX ADMIN — SODIUM CHLORIDE 1000 MILLILITER(S): 9 INJECTION, SOLUTION INTRAVENOUS at 19:55

## 2023-03-13 RX ADMIN — HYDROMORPHONE HYDROCHLORIDE 0.5 MILLIGRAM(S): 2 INJECTION INTRAMUSCULAR; INTRAVENOUS; SUBCUTANEOUS at 15:25

## 2023-03-13 RX ADMIN — GABAPENTIN 300 MILLIGRAM(S): 400 CAPSULE ORAL at 11:01

## 2023-03-13 RX ADMIN — HEPARIN SODIUM 5000 UNIT(S): 5000 INJECTION INTRAVENOUS; SUBCUTANEOUS at 11:01

## 2023-03-13 RX ADMIN — Medication 1000 MILLIGRAM(S): at 11:00

## 2023-03-13 NOTE — BRIEF OPERATIVE NOTE - NSICDXBRIEFPOSTOP_GEN_ALL_CORE_FT
POST-OP DIAGNOSIS:  Uterine fibroid 13-Mar-2023 15:17:08  Kaylee Lancaster  Abnormal uterine bleeding 13-Mar-2023 15:17:05  Kaylee Lancaster

## 2023-03-13 NOTE — H&P ADULT - ASSESSMENT
36yo  presents for scheduled RA myomectomy.  - consent signed  - will proceed with scheduled case  - d/w Dr. Aguilar

## 2023-03-13 NOTE — ASU DISCHARGE PLAN (ADULT/PEDIATRIC) - CARE PROVIDER_API CALL
Conner Aguilar)  Obstetrics and Gynecology  [address] [place] [address]  Phone: [phone]  Fax: [phone]  Follow Up Time:

## 2023-03-13 NOTE — BRIEF OPERATIVE NOTE - NSICDXBRIEFPREOP_GEN_ALL_CORE_FT
PRE-OP DIAGNOSIS:  Abnormal uterine bleeding 13-Mar-2023 15:17:00  Kaylee Lancaster  Uterine fibroid 13-Mar-2023 15:16:51  Kaylee Lancaster

## 2023-03-13 NOTE — ASU DISCHARGE PLAN (ADULT/PEDIATRIC) - NS MD DC FALL RISK RISK
For information on Fall & Injury Prevention, visit: [email or web address] OR  [email or web address] OR  https://www.cdc.gov/steadi/patient.html

## 2023-03-13 NOTE — BRIEF OPERATIVE NOTE - OPERATION/FINDINGS
EUA: grossly normal external genitalia. Hemorrhoid noted. Mobile 14w ut. Adnexa nonpalpable bl.   Lsc: Entry site atraumatic. Upper abdomen grossly normal. Approx 8cm IM myoma with subserosal extension adjacent to endometrial cavity. 5 additional 1-3cm subserosal myomas removed. Cavity was not entered. Bl adnexa grossly normal

## 2023-03-13 NOTE — H&P ADULT - HISTORY OF PRESENT ILLNESS
38yo  presents for scheduled RA myomectomy. Fibroids have been causing her HMB, some pelvic cramping.    Obhx: 1x CS at term for arrest of dilation c/b PEC  Gynhx: fibroids  PMH: migraines, anemia  PHS: CSx1  meds: excedrin prn  all: nkda  social: denies

## 2023-03-13 NOTE — BRIEF OPERATIVE NOTE - NSICDXBRIEFPROCEDURE_GEN_ALL_CORE_FT
PROCEDURES:  Robot-assisted laparoscopic uterine myomectomy of 5 or more myomas 13-Mar-2023 15:16:43  Kaylee Lancaster

## 2023-03-14 ENCOUNTER — TRANSCRIPTION ENCOUNTER (OUTPATIENT)
Age: 38
End: 2023-03-14

## 2023-03-14 VITALS
DIASTOLIC BLOOD PRESSURE: 64 MMHG | HEART RATE: 60 BPM | OXYGEN SATURATION: 99 % | SYSTOLIC BLOOD PRESSURE: 102 MMHG | TEMPERATURE: 98 F | RESPIRATION RATE: 18 BRPM

## 2023-03-14 PROCEDURE — S2900: CPT

## 2023-03-14 PROCEDURE — 36415 COLL VENOUS BLD VENIPUNCTURE: CPT

## 2023-03-14 PROCEDURE — 86900 BLOOD TYPING SEROLOGIC ABO: CPT

## 2023-03-14 PROCEDURE — 86850 RBC ANTIBODY SCREEN: CPT

## 2023-03-14 PROCEDURE — 86901 BLOOD TYPING SEROLOGIC RH(D): CPT

## 2023-03-14 PROCEDURE — 85025 COMPLETE CBC W/AUTO DIFF WBC: CPT

## 2023-03-14 PROCEDURE — C1889: CPT

## 2023-03-14 PROCEDURE — C1765: CPT

## 2023-03-14 PROCEDURE — 88305 TISSUE EXAM BY PATHOLOGIST: CPT

## 2023-03-14 RX ORDER — POLYETHYLENE GLYCOL 3350 17 G/17G
17 POWDER, FOR SOLUTION ORAL ONCE
Refills: 0 | Status: COMPLETED | OUTPATIENT
Start: 2023-03-14 | End: 2023-03-14

## 2023-03-14 RX ORDER — KETOROLAC TROMETHAMINE 30 MG/ML
30 SYRINGE (ML) INJECTION EVERY 6 HOURS
Refills: 0 | Status: DISCONTINUED | OUTPATIENT
Start: 2023-03-14 | End: 2023-03-14

## 2023-03-14 RX ADMIN — Medication 1000 MILLIGRAM(S): at 05:38

## 2023-03-14 RX ADMIN — PANTOPRAZOLE SODIUM 40 MILLIGRAM(S): 20 TABLET, DELAYED RELEASE ORAL at 06:49

## 2023-03-14 RX ADMIN — Medication 1000 MILLIGRAM(S): at 11:13

## 2023-03-14 RX ADMIN — Medication 1000 MILLIGRAM(S): at 06:38

## 2023-03-14 RX ADMIN — Medication 30 MILLIGRAM(S): at 11:13

## 2023-03-14 RX ADMIN — Medication 1000 MILLIGRAM(S): at 12:10

## 2023-03-14 RX ADMIN — Medication 30 MILLIGRAM(S): at 05:38

## 2023-03-14 RX ADMIN — Medication 30 MILLIGRAM(S): at 11:30

## 2023-03-14 RX ADMIN — Medication 1000 MILLIGRAM(S): at 01:01

## 2023-03-14 RX ADMIN — SODIUM CHLORIDE 125 MILLILITER(S): 9 INJECTION, SOLUTION INTRAVENOUS at 04:46

## 2023-03-14 RX ADMIN — POLYETHYLENE GLYCOL 3350 17 GRAM(S): 17 POWDER, FOR SOLUTION ORAL at 07:08

## 2023-03-14 RX ADMIN — Medication 30 MILLIGRAM(S): at 05:50

## 2023-03-14 RX ADMIN — SIMETHICONE 80 MILLIGRAM(S): 80 TABLET, CHEWABLE ORAL at 13:40

## 2023-03-14 RX ADMIN — Medication 30 MILLIGRAM(S): at 00:01

## 2023-03-14 RX ADMIN — Medication 1000 MILLIGRAM(S): at 00:01

## 2023-03-14 NOTE — DISCHARGE NOTE PROVIDER - CARE PROVIDER_API CALL
Conner Aguilar)  Obstetrics and Gynecology  [address] [place] [address]  Phone: [phone]  Fax: [phone]  Follow Up Time:

## 2023-03-14 NOTE — DISCHARGE NOTE PROVIDER - NSDCACTIVITY_GEN_ALL_CORE
Showering allowed/Stairs allowed/Walking - Indoors allowed/Walking - Outdoors allowed/Follow Instructions Provided by your Surgical Team Showering allowed/Stairs allowed/Walking - Indoors allowed/No heavy lifting/straining/Walking - Outdoors allowed/Follow Instructions Provided by your Surgical Team

## 2023-03-14 NOTE — DISCHARGE NOTE PROVIDER - NSDCMRMEDTOKEN_GEN_ALL_CORE_FT
acetaminophen 500 mg oral tablet: 2 tab(s) orally every 6 hours  ferrous sulfate: orally once a day  liquid iron: once a day

## 2023-03-14 NOTE — DISCHARGE NOTE PROVIDER - HOSPITAL COURSE
PT admitted s/p scheduled RA Myomectomy, 6 fibroids removed, did not enter cavity. Interceed placed. Had palpitations, N/V in PACU so admitted ON for obs. POD1 pt clinically and hemodynamically stable for D/C PT admitted s/p scheduled RA Myomectomy, 6 fibroids removed, did not enter cavity. Interceed placed. Had palpitations, N/V in PACU so admitted ON for obs, which resolved by the AM. POD1 pt clinically and hemodynamically stable for D/C

## 2023-03-14 NOTE — PATIENT PROFILE ADULT - FALL HARM RISK - HARM RISK INTERVENTIONS

## 2023-03-14 NOTE — DISCHARGE NOTE PROVIDER - NSDCCPTREATMENT_GEN_ALL_CORE_FT
PRINCIPAL PROCEDURE  Procedure: Robot-assisted laparoscopic uterine myomectomy of 5 or more myomas  Findings and Treatment:

## 2023-03-14 NOTE — DISCHARGE NOTE NURSING/CASE MANAGEMENT/SOCIAL WORK - PATIENT PORTAL LINK FT
You can access the FollowMyHealth Patient Portal offered by [facility] by registering at the following website: [email or web address]. By joining [facility] FollowMyHealth portal, you will also be able to view your health information using other applications (apps) compatible with our system.

## 2023-03-14 NOTE — PROGRESS NOTE ADULT - SUBJECTIVE AND OBJECTIVE BOX
GYN Progress Note    Patient seen at bedside. Had some nausea in pacu and palpitations in pacu last night, both have resolved overnight.  Pain controlled overnight.  Tolerating PO fluids.  OOB ambulating without assistance.  Voiding  No flatus yet.    Denies CP, palpitations, SOB, fever, chills, nausea, vomiting.    Vital Signs Last 24 Hrs  T(C): 36.8 (14 Mar 2023 05:23), Max: 36.8 (13 Mar 2023 22:29)  T(F): 98.2 (14 Mar 2023 05:23), Max: 98.2 (13 Mar 2023 22:29)  HR: 74 (14 Mar 2023 05:23) (70 - 102)  BP: 103/69 (14 Mar 2023 05:23) (103/69 - 131/63)  BP(mean): 87 (13 Mar 2023 21:23) (83 - 90)  RR: 17 (14 Mar 2023 05:23) (10 - 26)  SpO2: 98% (14 Mar 2023 05:23) (95% - 100%)    Parameters below as of 14 Mar 2023 05:23  Patient On (Oxygen Delivery Method): room air        Physical Exam:  Gen: No Acute Distress  GI: soft, appropriately tender, nondistended, normoactive BS, no rebound, no guarding.  Incision C/D/I  Ext: SCDs in place, wnl    I&O's Summary    13 Mar 2023 07:01  -  14 Mar 2023 06:40  --------------------------------------------------------  IN: 4125 mL / OUT: 2130 mL / NET: 1995 mL      MEDICATIONS  (STANDING):  acetaminophen     Tablet .. 1000 milliGRAM(s) Oral every 6 hours  ketorolac   Injectable 30 milliGRAM(s) IV Push every 6 hours  lactated ringers. 1000 milliLiter(s) (125 mL/Hr) IV Continuous <Continuous>  pantoprazole    Tablet 40 milliGRAM(s) Oral before breakfast    MEDICATIONS  (PRN):  HYDROmorphone  Injectable 0.5 milliGRAM(s) IV Push every 15 minutes PRN Severe Pain (7 - 10)  metoclopramide Injectable 10 milliGRAM(s) IV Push every 8 hours PRN Nausea and/or Vomiting  ondansetron Injectable 8 milliGRAM(s) IV Push every 8 hours PRN Nausea and/or Vomiting  oxyCODONE    IR 5 milliGRAM(s) Oral every 4 hours PRN Moderate Pain (4 - 6)  oxyCODONE    IR 10 milliGRAM(s) Oral every 4 hours PRN Severe Pain (7 - 10)  simethicone 80 milliGRAM(s) Chew every 6 hours PRN Gas      LABS:                        11.2   14.75 )-----------( 204      ( 13 Mar 2023 20:45 )             35.8

## 2023-03-14 NOTE — PROGRESS NOTE ADULT - ASSESSMENT
38yo  POD1 s/p scheduled RA Myomectomy, 6 fibroids removed, did not enter cavity. Had palpitations, N/V in PACU so admitted ON for observation. Doing well this AM.    Neuro: tylenol/toradol, oxy prn  CV: LR at 125cc/h, will heplock  Pulm: RA  FEN/GI: LRD, simethicone, zofran/reglan prn, pantoprazole qd  : voiding  GYN: s/p RA myomectomy  Heme: hgb 10.4> (postop PACU) 11.2  VTE prophylaxis: SCD

## 2023-03-14 NOTE — DISCHARGE NOTE PROVIDER - NSDCFUSCHEDAPPT_GEN_ALL_CORE_FT
Conner Aguilar [facility]  OBGYMARY [address]  Scheduled Appointment: 03/28/2023    Conner Aguilar [facility]  [place] [address]  Scheduled Appointment: 04/25/2023

## 2023-03-18 ENCOUNTER — TRANSCRIPTION ENCOUNTER (OUTPATIENT)
Age: 38
End: 2023-03-18

## 2023-03-20 DIAGNOSIS — Z91.010 ALLERGY TO PEANUTS: ICD-10-CM

## 2023-03-20 DIAGNOSIS — D25.2 SUBSEROSAL LEIOMYOMA OF UTERUS: ICD-10-CM

## 2023-03-20 DIAGNOSIS — D25.9 LEIOMYOMA OF UTERUS, UNSPECIFIED: ICD-10-CM

## 2023-03-20 DIAGNOSIS — K21.9 GASTRO-ESOPHAGEAL REFLUX DISEASE WITHOUT ESOPHAGITIS: ICD-10-CM

## 2023-03-20 DIAGNOSIS — K64.9 UNSPECIFIED HEMORRHOIDS: ICD-10-CM

## 2023-03-20 DIAGNOSIS — D50.0 IRON DEFICIENCY ANEMIA SECONDARY TO BLOOD LOSS (CHRONIC): ICD-10-CM

## 2023-03-20 DIAGNOSIS — N92.0 EXCESSIVE AND FREQUENT MENSTRUATION WITH REGULAR CYCLE: ICD-10-CM

## 2023-03-20 DIAGNOSIS — I10 ESSENTIAL (PRIMARY) HYPERTENSION: ICD-10-CM

## 2023-03-21 ENCOUNTER — NON-APPOINTMENT (OUTPATIENT)
Age: 38
End: 2023-03-21

## 2023-03-23 ENCOUNTER — APPOINTMENT (OUTPATIENT)
Dept: OBGYN | Facility: CLINIC | Age: 38
End: 2023-03-23
Payer: COMMERCIAL

## 2023-03-23 VITALS
WEIGHT: 148 LBS | HEIGHT: 63 IN | OXYGEN SATURATION: 98 % | BODY MASS INDEX: 26.22 KG/M2 | HEART RATE: 68 BPM | SYSTOLIC BLOOD PRESSURE: 110 MMHG | DIASTOLIC BLOOD PRESSURE: 72 MMHG

## 2023-03-23 PROCEDURE — 99024 POSTOP FOLLOW-UP VISIT: CPT

## 2023-03-23 RX ORDER — CEPHALEXIN 500 MG/1
500 TABLET ORAL
Qty: 14 | Refills: 0 | Status: ACTIVE | COMMUNITY
Start: 2023-03-23 | End: 1900-01-01

## 2023-03-23 NOTE — DISCUSSION/SUMMARY
[FreeTextEntry1] : Postoperative seroma after robotic assisted myomectomy\par -antibiotics\par -wound care reviewed\par -f/u 1 week

## 2023-03-23 NOTE — HISTORY OF PRESENT ILLNESS
[FreeTextEntry1] : 38 yo patient presents for discharge around belly button that started yesterday. Denies fevers/chills. Regular voding and bowel movements.

## 2023-03-23 NOTE — PHYSICAL EXAM
[Chaperone Present] : A chaperone was present in the examining room during all aspects of the physical examination [Appropriately responsive] : appropriately responsive [Alert] : alert [No Acute Distress] : no acute distress [Oriented x3] : oriented x3 [Soft] : soft [Non-tender] : non-tender [FreeTextEntry7] : incision umbilical glue removed large amount of fluid evacuated, no odor, clear. Lateral incisions well healed

## 2023-03-28 ENCOUNTER — APPOINTMENT (OUTPATIENT)
Dept: OBGYN | Facility: CLINIC | Age: 38
End: 2023-03-28
Payer: COMMERCIAL

## 2023-03-28 VITALS
DIASTOLIC BLOOD PRESSURE: 81 MMHG | WEIGHT: 147 LBS | SYSTOLIC BLOOD PRESSURE: 118 MMHG | HEART RATE: 67 BPM | HEIGHT: 63 IN | BODY MASS INDEX: 26.05 KG/M2 | OXYGEN SATURATION: 95 %

## 2023-03-28 PROCEDURE — 99024 POSTOP FOLLOW-UP VISIT: CPT

## 2023-03-28 NOTE — DISCUSSION/SUMMARY
[facility] called for RF. Med refill sent.   [FreeTextEntry1] : Postoperative seroma after robotic assisted myomectomy\par -healing well\par -f/u planned postop visit\par -precautions reviewed

## 2023-03-28 NOTE — PHYSICAL EXAM
[Chaperone Present] : A chaperone was present in the examining room during all aspects of the physical examination [Appropriately responsive] : appropriately responsive [Alert] : alert [No Acute Distress] : no acute distress [Soft] : soft [Non-tender] : non-tender [Oriented x3] : oriented x3 [FreeTextEntry7] : umbilical incision, no erythema healing well small 4mm opening in middle small fluid expressed

## 2023-03-29 LAB — SURGICAL PATHOLOGY STUDY: SIGNIFICANT CHANGE UP

## 2023-04-11 PROBLEM — R03.0 ELEVATED BLOOD-PRESSURE READING, WITHOUT DIAGNOSIS OF HYPERTENSION: Chronic | Status: ACTIVE | Noted: 2023-03-10

## 2023-04-11 PROBLEM — K21.9 GASTRO-ESOPHAGEAL REFLUX DISEASE WITHOUT ESOPHAGITIS: Chronic | Status: ACTIVE | Noted: 2023-03-10

## 2023-04-11 PROBLEM — D50.9 IRON DEFICIENCY ANEMIA, UNSPECIFIED: Chronic | Status: ACTIVE | Noted: 2023-03-10

## 2023-04-11 PROBLEM — Z86.79 PERSONAL HISTORY OF OTHER DISEASES OF THE CIRCULATORY SYSTEM: Chronic | Status: ACTIVE | Noted: 2023-03-10

## 2023-04-11 PROBLEM — Z87.898 PERSONAL HISTORY OF OTHER SPECIFIED CONDITIONS: Chronic | Status: ACTIVE | Noted: 2023-03-10

## 2023-04-11 PROBLEM — N84.0 POLYP OF CORPUS UTERI: Chronic | Status: ACTIVE | Noted: 2023-03-10

## 2023-04-11 PROBLEM — N85.2 HYPERTROPHY OF UTERUS: Chronic | Status: ACTIVE | Noted: 2023-03-10

## 2023-04-11 PROBLEM — D21.9 BENIGN NEOPLASM OF CONNECTIVE AND OTHER SOFT TISSUE, UNSPECIFIED: Chronic | Status: ACTIVE | Noted: 2023-03-10

## 2023-04-11 PROBLEM — Z87.59 PERSONAL HISTORY OF OTHER COMPLICATIONS OF PREGNANCY, CHILDBIRTH AND THE PUERPERIUM: Chronic | Status: ACTIVE | Noted: 2023-03-10

## 2023-04-25 ENCOUNTER — APPOINTMENT (OUTPATIENT)
Dept: OBGYN | Facility: CLINIC | Age: 38
End: 2023-04-25
Payer: COMMERCIAL

## 2023-04-25 VITALS
OXYGEN SATURATION: 98 % | BODY MASS INDEX: 26.75 KG/M2 | WEIGHT: 151 LBS | DIASTOLIC BLOOD PRESSURE: 80 MMHG | HEIGHT: 63 IN | HEART RATE: 73 BPM | SYSTOLIC BLOOD PRESSURE: 120 MMHG

## 2023-04-25 DIAGNOSIS — L76.34 POSTPROCEDURAL SEROMA OF SKIN AND SUBCUTANEOUS TISSUE FOLLOWING OTHER PROCEDURE: ICD-10-CM

## 2023-04-25 DIAGNOSIS — Z86.018 PERSONAL HISTORY OF OTHER BENIGN NEOPLASM: ICD-10-CM

## 2023-04-25 DIAGNOSIS — Z41.9 ENCOUNTER FOR PROCEDURE FOR PURPOSES OTHER THAN REMEDYING HEALTH STATE, UNSPECIFIED: ICD-10-CM

## 2023-04-25 DIAGNOSIS — Z87.42 PERSONAL HISTORY OF OTHER DISEASES OF THE FEMALE GENITAL TRACT: ICD-10-CM

## 2023-04-25 DIAGNOSIS — N84.0 POLYP OF CORPUS UTERI: ICD-10-CM

## 2023-04-25 DIAGNOSIS — N93.9 ABNORMAL UTERINE AND VAGINAL BLEEDING, UNSPECIFIED: ICD-10-CM

## 2023-04-25 DIAGNOSIS — N90.89 OTHER SPECIFIED NONINFLAMMATORY DISORDERS OF VULVA AND PERINEUM: ICD-10-CM

## 2023-04-25 DIAGNOSIS — Z87.898 PERSONAL HISTORY OF OTHER SPECIFIED CONDITIONS: ICD-10-CM

## 2023-04-25 DIAGNOSIS — N85.2 HYPERTROPHY OF UTERUS: ICD-10-CM

## 2023-04-25 DIAGNOSIS — N92.6 IRREGULAR MENSTRUATION, UNSPECIFIED: ICD-10-CM

## 2023-04-25 PROCEDURE — 99024 POSTOP FOLLOW-UP VISIT: CPT

## 2023-04-25 NOTE — COUNSELING
[Lab Results] : lab results [Medication Management] : medication management [Pre/Post Op Instructions] : pre/post op instructions [Nutrition/ Exercise/ Weight Management] : nutrition, exercise, weight management

## 2023-05-03 PROBLEM — N92.6 LATE MENSES: Status: RESOLVED | Noted: 2022-12-13 | Resolved: 2023-05-03

## 2023-05-03 PROBLEM — N84.0 ENDOMETRIAL POLYP: Status: RESOLVED | Noted: 2021-09-29 | Resolved: 2023-05-03

## 2023-05-03 PROBLEM — Z41.9 ELECTIVE SURGERY: Status: RESOLVED | Noted: 2023-03-07 | Resolved: 2023-05-03

## 2023-05-03 PROBLEM — L76.34 POSTPROCEDURAL SEROMA OF SKIN AND SUBCUTANEOUS TISSUE FOLLOWING OTHER PROCEDURE: Status: RESOLVED | Noted: 2023-03-23 | Resolved: 2023-05-03

## 2023-05-03 NOTE — DISCUSSION/SUMMARY
[FreeTextEntry1] : 38 year old s/p Roboticassisted laparoscopic myomectomy presents for postoperative visit doing well \par -resume all activity without limitation \par -avoid pregnancy for at least 4 months\par -resume routine GYN care\par -follow up 6 months \par \par

## 2023-05-03 NOTE — PHYSICAL EXAM
[Chaperone Present] : A chaperone was present in the examining room during all aspects of the physical examination [Appropriately responsive] : appropriately responsive [Alert] : alert [No Acute Distress] : no acute distress [Soft] : soft [Non-tender] : non-tender [Non-distended] : non-distended [Oriented x3] : oriented x3 [FreeTextEntry7] : incision clean, dry, intact  [Labia Majora] : normal [Labia Minora] : normal [Normal] : normal [Anteversion] : anteverted [Uterine Adnexae] : normal

## 2023-05-03 NOTE — HISTORY OF PRESENT ILLNESS
[FreeTextEntry1] : 38 year yo presents for postoperative visit s/p Roboticassisted laparoscopic myomectomy on 03/13/23.  She reports regular voiding and bowel movements, denies fevers/chills/dysuria.  Not taking pain medication. Occasionally she feel some discomfort in her abdomen. Seroma resolved.

## 2023-05-23 ENCOUNTER — NON-APPOINTMENT (OUTPATIENT)
Age: 38
End: 2023-05-23

## 2023-07-11 DIAGNOSIS — R10.33 PERIUMBILICAL PAIN: ICD-10-CM

## 2023-07-26 ENCOUNTER — APPOINTMENT (OUTPATIENT)
Dept: HEMATOLOGY ONCOLOGY | Facility: CLINIC | Age: 38
End: 2023-07-26

## 2023-08-21 ENCOUNTER — TRANSCRIPTION ENCOUNTER (OUTPATIENT)
Age: 38
End: 2023-08-21

## 2023-08-23 ENCOUNTER — NON-APPOINTMENT (OUTPATIENT)
Age: 38
End: 2023-08-23

## 2023-10-10 ENCOUNTER — APPOINTMENT (OUTPATIENT)
Dept: PLASTIC SURGERY | Facility: CLINIC | Age: 38
End: 2023-10-10

## 2023-10-20 ENCOUNTER — APPOINTMENT (OUTPATIENT)
Dept: OBGYN | Facility: CLINIC | Age: 38
End: 2023-10-20
Payer: COMMERCIAL

## 2023-10-20 VITALS
WEIGHT: 154 LBS | DIASTOLIC BLOOD PRESSURE: 89 MMHG | BODY MASS INDEX: 27.28 KG/M2 | OXYGEN SATURATION: 99 % | HEART RATE: 85 BPM | SYSTOLIC BLOOD PRESSURE: 129 MMHG

## 2023-10-20 DIAGNOSIS — Z01.419 ENCOUNTER FOR GYNECOLOGICAL EXAMINATION (GENERAL) (ROUTINE) W/OUT ABNORMAL FINDINGS: ICD-10-CM

## 2023-10-20 DIAGNOSIS — Z11.3 ENCOUNTER FOR SCREENING FOR INFECTIONS WITH A PREDOMINANTLY SEXUAL MODE OF TRANSMISSION: ICD-10-CM

## 2023-10-20 PROCEDURE — 99396 PREV VISIT EST AGE 40-64: CPT

## 2023-10-20 PROCEDURE — 36415 COLL VENOUS BLD VENIPUNCTURE: CPT

## 2023-10-24 ENCOUNTER — NON-APPOINTMENT (OUTPATIENT)
Age: 38
End: 2023-10-24

## 2023-10-24 LAB — HIV1+2 AB SPEC QL IA.RAPID: NONREACTIVE

## 2023-11-02 ENCOUNTER — APPOINTMENT (OUTPATIENT)
Dept: OBGYN | Facility: CLINIC | Age: 38
End: 2023-11-02
Payer: COMMERCIAL

## 2023-11-02 VITALS
OXYGEN SATURATION: 99 % | BODY MASS INDEX: 26.75 KG/M2 | HEART RATE: 75 BPM | SYSTOLIC BLOOD PRESSURE: 121 MMHG | DIASTOLIC BLOOD PRESSURE: 83 MMHG | WEIGHT: 151 LBS | HEIGHT: 63 IN

## 2023-11-02 DIAGNOSIS — K42.9 UMBILICAL HERNIA W/OUT OBSTRUCTION OR GANGRENE: ICD-10-CM

## 2023-11-02 DIAGNOSIS — Z98.890 OTHER SPECIFIED POSTPROCEDURAL STATES: ICD-10-CM

## 2023-11-02 PROCEDURE — 99214 OFFICE O/P EST MOD 30 MIN: CPT

## 2023-11-02 PROCEDURE — 76830 TRANSVAGINAL US NON-OB: CPT

## 2023-11-08 ENCOUNTER — NON-APPOINTMENT (OUTPATIENT)
Age: 38
End: 2023-11-08

## 2023-11-08 LAB
CYTOLOGY CVX/VAG DOC THIN PREP: NORMAL
HBV SURFACE AG SER QL: NONREACTIVE
HCV AB SER QL: NONREACTIVE
HCV S/CO RATIO: 0.12 S/CO
T PALLIDUM AB SER QL IA: NEGATIVE

## 2023-11-15 ENCOUNTER — TRANSCRIPTION ENCOUNTER (OUTPATIENT)
Age: 38
End: 2023-11-15

## 2023-11-15 VITALS
TEMPERATURE: 98 F | HEART RATE: 71 BPM | SYSTOLIC BLOOD PRESSURE: 131 MMHG | DIASTOLIC BLOOD PRESSURE: 81 MMHG | HEIGHT: 63 IN | RESPIRATION RATE: 16 BRPM | WEIGHT: 148.59 LBS

## 2023-11-15 RX ORDER — FERROUS SULFATE 325(65) MG
0 TABLET ORAL
Qty: 0 | Refills: 0 | DISCHARGE

## 2023-11-16 ENCOUNTER — OUTPATIENT (OUTPATIENT)
Dept: OUTPATIENT SERVICES | Facility: HOSPITAL | Age: 38
LOS: 1 days | Discharge: HOME | End: 2023-11-16
Payer: COMMERCIAL

## 2023-11-16 ENCOUNTER — TRANSCRIPTION ENCOUNTER (OUTPATIENT)
Age: 38
End: 2023-11-16

## 2023-11-16 DIAGNOSIS — Z98.891 HISTORY OF UTERINE SCAR FROM PREVIOUS SURGERY: Chronic | ICD-10-CM

## 2023-11-16 DEVICE — MESH HERNIA INGUINAL PARIETEX PROGRIP RECTANGLE 15 X 15CM: Type: IMPLANTABLE DEVICE | Status: FUNCTIONAL

## 2023-11-16 RX ORDER — ONDANSETRON 8 MG/1
4 TABLET, FILM COATED ORAL EVERY 6 HOURS
Refills: 0 | Status: DISCONTINUED | OUTPATIENT
Start: 2023-11-16 | End: 2023-11-17

## 2023-11-16 RX ORDER — OXYCODONE HYDROCHLORIDE 5 MG/1
5 TABLET ORAL EVERY 4 HOURS
Refills: 0 | Status: DISCONTINUED | OUTPATIENT
Start: 2023-11-16 | End: 2023-11-17

## 2023-11-16 RX ORDER — ACETAMINOPHEN 500 MG
1000 TABLET ORAL ONCE
Refills: 0 | Status: DISCONTINUED | OUTPATIENT
Start: 2023-11-16 | End: 2023-11-17

## 2023-11-16 RX ORDER — HYDROMORPHONE HYDROCHLORIDE 2 MG/ML
0.5 INJECTION INTRAMUSCULAR; INTRAVENOUS; SUBCUTANEOUS
Refills: 0 | Status: DISCONTINUED | OUTPATIENT
Start: 2023-11-16 | End: 2023-11-17

## 2023-11-16 RX ORDER — HEPARIN SODIUM 5000 [USP'U]/ML
5000 INJECTION INTRAVENOUS; SUBCUTANEOUS EVERY 8 HOURS
Refills: 0 | Status: DISCONTINUED | OUTPATIENT
Start: 2023-11-17 | End: 2023-11-17

## 2023-11-16 RX ORDER — ACETAMINOPHEN 500 MG
650 TABLET ORAL EVERY 6 HOURS
Refills: 0 | Status: DISCONTINUED | OUTPATIENT
Start: 2023-11-16 | End: 2023-11-17

## 2023-11-16 RX ORDER — SODIUM CHLORIDE 9 MG/ML
1000 INJECTION, SOLUTION INTRAVENOUS
Refills: 0 | Status: DISCONTINUED | OUTPATIENT
Start: 2023-11-16 | End: 2023-11-17

## 2023-11-16 NOTE — ASU DISCHARGE PLAN (ADULT/PEDIATRIC) - CALL YOUR DOCTOR IF YOU HAVE ANY OF THE FOLLOWING:
Bleeding that does not stop/Pain not relieved by Medications/Fever greater than (need to indicate Fahrenheit or Celsius)/Wound/Surgical Site with redness, or foul smelling discharge or pus/Numbness, tingling, color or temperature change to extremity/Nausea and vomiting that does not stop/Inability to tolerate liquids or foods/Increased irritability or sluggishness

## 2023-11-16 NOTE — ASU DISCHARGE PLAN (ADULT/PEDIATRIC) - FOLLOW UP APPOINTMENTS
Subjective   Petty Harris is a 36 y.o. female. Presents today for   Chief Complaint   Patient presents with   • Establish Care   • Chest Pain     after covid       New patient here to establish care    Chest Pain   This is a new problem. The current episode started in the past 7 days (Had covid 19 2 weeks ago, recovering but having chest kia nwith deep breath). The onset quality is sudden. The problem occurs intermittently. The problem has been waxing and waning. The pain is present in the substernal region. The quality of the pain is described as sharp and stabbing. The pain does not radiate. Associated symptoms include shortness of breath (with exertion). Pertinent negatives include no cough, exertional chest pressure, fever, hemoptysis, orthopnea or palpitations. The pain is aggravated by breathing. She has tried nothing for the symptoms. The treatment provided no relief.   Pertinent negatives for past medical history include no CAD, no diabetes, no MI and no PE.   Pertinent negatives for family medical history include: no CAD and no PE. Prior workup: no prior work-up.     Has facial pigmented skin lesionsw, would like see dermatology  Review of Systems   Constitutional: Negative for fever.   Respiratory: Positive for shortness of breath (with exertion). Negative for cough and hemoptysis.    Cardiovascular: Positive for chest pain. Negative for palpitations and orthopnea.       There is no problem list on file for this patient.    No past medical history on file.  No past surgical history on file.  No family history on file.  Social History     Socioeconomic History   • Marital status:      Spouse name: Not on file   • Number of children: Not on file   • Years of education: Not on file   • Highest education level: Not on file       Not on File    No current outpatient medications on file prior to visit.     No current facility-administered medications on file prior to visit.       Objective   Vitals:  "   09/27/21 1525   BP: 122/74   Pulse: 80   SpO2: 100%   Weight: 95.7 kg (211 lb)   Height: 167.6 cm (66\")     Body mass index is 34.06 kg/m².  Physical Exam  Vitals and nursing note reviewed.   Constitutional:       Appearance: She is well-developed.   HENT:      Head: Normocephalic and atraumatic.   Neck:      Thyroid: No thyromegaly.      Vascular: No JVD.   Cardiovascular:      Rate and Rhythm: Normal rate and regular rhythm.      Heart sounds: Normal heart sounds. No murmur heard.   No friction rub. No gallop.    Pulmonary:      Effort: Pulmonary effort is normal. No respiratory distress.      Breath sounds: Normal breath sounds. No wheezing or rales.   Abdominal:      General: Bowel sounds are normal. There is no distension.      Palpations: Abdomen is soft.      Tenderness: There is no abdominal tenderness. There is no guarding or rebound.   Musculoskeletal:      Cervical back: Neck supple.   Skin:     General: Skin is warm and dry.   Neurological:      Mental Status: She is alert.   Psychiatric:         Behavior: Behavior normal.       ECG 12 Lead - atypical chest pain    Date/Time: 9/27/2021 3:55 PM  Performed by: Saran Rios DO  Authorized by: Saran Rios DO   Comparison: not compared with previous ECG   Previous ECG: no previous ECG available  Rhythm: sinus rhythm  Rate: normal  BPM: 66  Conduction: conduction normal  ST Segments: ST segments normal  T Waves: T waves normal  QRS axis: normal  Other: no other findings    Clinical impression: normal ECG        XRAY: cxr  INDICATION:  Atypical cp, dyspnea;  Recent covid 19  Wet read:  RLL infiltrates  No Comparative data  Imagine independently viewed by myself  Radiology reading pending.        Assessment/Plan   Diagnoses and all orders for this visit:    1. Other chest pain (Primary)  -     ECG 12 Lead - atypical chest pain  -     XR Chest PA & Lateral (In Office)  -     CBC & Differential  -     Comprehensive Metabolic Panel  -     " Sedimentation Rate  -     C-reactive Protein  -     D-dimer, Quantitative  -     azithromycin (ZITHROMAX) 250 MG tablet; Take first 2 tablets together, then 1 every day until finished.  Dispense: 6 tablet; Refill: 0  -     dexamethasone (DECADRON) 4 MG tablet; Take 1 tablet by mouth 2 (Two) Times a Day With Meals.  Dispense: 14 tablet; Refill: 0    2. COVID-19 virus infection  -     CBC & Differential  -     Comprehensive Metabolic Panel  -     Sedimentation Rate  -     C-reactive Protein  -     D-dimer, Quantitative    3. Shortness of breath  -     ECG 12 Lead - atypical chest pain  -     CBC & Differential  -     Comprehensive Metabolic Panel  -     Sedimentation Rate  -     C-reactive Protein  -     D-dimer, Quantitative  -     azithromycin (ZITHROMAX) 250 MG tablet; Take first 2 tablets together, then 1 every day until finished.  Dispense: 6 tablet; Refill: 0  -     dexamethasone (DECADRON) 4 MG tablet; Take 1 tablet by mouth 2 (Two) Times a Day With Meals.  Dispense: 14 tablet; Refill: 0    4. Cough    5. Community acquired pneumonia of right lower lobe of lung  -     azithromycin (ZITHROMAX) 250 MG tablet; Take first 2 tablets together, then 1 every day until finished.  Dispense: 6 tablet; Refill: 0    6. Pigmented skin lesions  -     Ambulatory Referral to Dermatology    likely due to covid 19, past contagious;  Will give zpack and decadron see if helps;  If d dimer negative, no scan;  PE seems les slikely as HR and SaO2 normal, EKG no tachycardia or strain patter.  Did discuss that d dimer can be elevated for non-vte reasons including infection.  GO ER if worsening condition.         -Follow up: 6 to 8 weeks and prn        709

## 2023-11-16 NOTE — ASU DISCHARGE PLAN (ADULT/PEDIATRIC) - ***IN THE EVENT THAT YOU DEVELOP A COMPLICATION AND YOU ARE UNABLE TO REACH YOUR OWN PHYSICIAN, YOU MAY CONTACT:
Plan/Goals for the next 2-3 weeks:    1. Start Food journal beginning tomorrow morning, record all food and drink.    2. Drink at least 64 ounces of water daily.    3. Increase activity to at least 30-45 minutes 3-5 days during this week. Start slow and build up as tolerated.    4. Track your daily intake on Baritastic for the next 2 weeks. We will set your goals up on our next visit. The rhonda helps track calories.    5. Go for ordered labs, if any have been ordered.    6. Watch Bariatric informational link sent to your email.    Return for next visit in 2-3 week       4 Steps for Eating Healthier  Changing the way you eat can improve your health. It can lower your cholesterol and blood pressure, and help you stay at a healthy weight. Your diet doesn’t have to be bland and boring to be healthy. Just watch your calories and follow these steps:    Step 1. Eat fewer unhealthy fats  Choose more fish and lean meats instead of fatty cuts of meat.  Skip butter and lard, and use less margarine.  Pass on foods that have palm, coconut, or hydrogenated oils.  Eat fewer high-fat dairy foods like cheese, ice cream, and whole milk.  Get a heart-healthy cookbook and try some low-fat recipes.  Step 2. Go light on salt  Keep the saltshaker off the table.  Limit high-salt ingredients, such as soy sauce, bouillon, and garlic salt.  Instead of adding salt when cooking, season your food with herbs and flavorings. Try lemon, garlic, and onion, or salt-free herb seasonings.  Limit convenience foods, such as boxed or canned foods and restaurant food.  Read food labels and choose lower-sodium options.  Step 3. Limit sugar  Pause before you add sugars to pancakes, cereal, coffee, or tea. This includes white and brown table sugar, syrup, honey, and molasses. Cut your usual amount by half.  Use non-sugar sweeteners. Stevia, aspartame, and sucralose can satisfy a sweet tooth without adding calories.  Swap out sugar-filled soda and other drinks.  Buy sugar-free or low-calorie beverages. Remember water is always the best choice.  Read labels and choose foods with less added sugar. Keep in mind that dairy foods and foods with fruit will have some natural sugar.  Cut the sugar in recipes by 1/3 to 1/2. Boost the flavor with extracts like almond, vanilla, or orange. Or add spices such as cinnamon or nutmeg.  Step 4. Eat more fiber  Eat fresh fruits and vegetables every day.  Boost your diet with whole grains. Go for oats, whole-grain rice, and bran.  Add beans and lentils to your meals.  Drink more water to match your fiber increase to help prevent constipation.  Date Last Reviewed: 6/1/2017 © 2000-2018 [employer]. [address] [place] [address], [place] [address]. All rights reserved. This information is not intended as a substitute for professional medical care. Always follow your healthcare professional's instructions.            Deciding on Bariatric Surgery  Is excess weight affecting your life and your health? Bariatric surgery (also called obesity surgery) may help you reach a healthier weight. This surgery alters your digestive system. For the surgery to be successful, you must change your diet and lifestyle. In most cases, the surgery is not reversible. So if you’re considering surgery, learn all you can about it before you decide. Bariatric surgery also has a number of potential risks and complications that you need to discuss with your surgeon.   Qualifying for surgery  Surgery is not for everyone. To qualify:   You must have a BMI of 40 or more, or a BMI of 35 or more (see box below) plus a serious obesity-related health problem, such as type 2 diabetes, high blood pressure, or sleep apnea.  You must be healthy enough to have surgery.  You may be required to have a psychological evaluation.  You must have tried to lose weight by other means, such as diet and exercise.  Setting realistic expectations  The goal of bariatric surgery is to help  you lose over half of your excess weight. This can improve or prevent health problems. This surgery is not done only for cosmetic reasons. Keep in mind that:   Other weight-loss methods should be tried first. Lifestyle changes, behavioral modifications, and prescription medicines are initial choices. Surgery is only a choice if other methods don’t work.  Surgery is meant to be permanent. You will need to change how you eat for the rest of your life.  You must commit to eating less and being more active after surgery. If you don’t, you will not lose or keep off the weight over the long term.  You won’t reach a healthy weight right away. Most weight is lost steadily during the first year or two after surgery.  Most likely, you won’t lose all your excess weight. But you can reach a much healthier weight, and maintain it by following your healthcare provider's advice for diet and exercise.  BMI  Obesity is measured by a formula called body mass index (BMI), which is based on your height and weight. A healthy BMI is about 18 to 25. A BMI of 30 or more signals obesity. A BMI of 35 or more is severe obesity. A BMI of 40 or more reflects morbid obesity. You can calculate your own BMI .   To learn more  American Society for Metabolic and Bariatric Surgery  National Heart, Lung, and Blood [place] Obesity Education Initiative    [employer] last reviewed this educational content on 9/1/2021    © 1192-1977 [employer]. All rights reserved. This information is not intended as a substitute for professional medical care. Always follow your healthcare professional's instructions.          Understanding Body Mass Index (BMI)  Body mass index (BMI) is a method of screening for a weight category using the ratio of your height to your weight. The BMI is a measure of overweight that is corrected for height. Knowing your BMI is a way to tell if you are at a healthy weight. The higher your BMI, the greater your risk for  weight-related health problems.  What BMI means  BMI below 18.5: Underweight  BMI 18.5 to 24.9: Healthy weight or \"ideal body weight\"   BMI 25 to 29.9: Overweight  BMI 30 and over: Obese  BMI 40 and over: Severe obesity   Online BMI Calculators  Find your BMI with an online BMI calculator tool, such as these from the CDC:  BMI calculator for adults  BMI calculator for children and teens   Using the BMI chart  To figure out your BMI, find your height and weight (or the numbers closest to them) on the table below. Follow each column of numbers to where your height and weight meet on the table. That is your BMI.    Date Last Reviewed: 7/1/2016  © 9013-8770 [employer]. [address] [place] [address], [place] [address]. All rights reserved. This information is not intended as a substitute for professional medical care. Always follow your healthcare professional's instructions.          Weight Management: Getting Started  Healthy bodies come in all shapes and sizes. Not all bodies are made to be thin. For some people, a healthy weight is higher than the average weight listed on weight charts. Your healthcare provider can help you decide on a healthy weight for you.   Reasons to lose weight  Losing weight can help with some health problems, such as high blood pressure, heart disease, diabetes, sleep apnea, and arthritis. You may also feel more energy.   Set your long-term goal  Your goal doesn't even have to be a specific weight. You may decide on a fitness goal such as being able to walk 10 miles a week, or a health goal such as lowering your blood pressure. Choose a goal that is measurable and reasonable, so you know when you've reached it. A goal of reaching a BMI of less than 25 is not always reasonable (or possible).    Make an action plan  Habits don’t change overnight. Setting your goals too high can leave you feeling discouraged if you can’t reach them. Be realistic. Choose one or two small changes you can  make now. Set an action plan for how you are going to make these changes. When you can stick to this plan, keep making a few more small changes. Taking small steps will help you stay on the path to success.   Track your progress  Write down your goals. Then keep a daily record of your progress. Write down what you eat and how active you are. This record lets you look back on how much you’ve done. It may also help when you’re feeling frustrated. Reward yourself for success. Even if you don’t reach every goal, give yourself credit for what you do get done.     Get support  Encouragement from others can help make losing weight easier. Ask your family members and friends for support. They may even want to join you. Also look to your healthcare provider, registered dietitian, and  for help. Your local hospital can give you more information about nutrition, exercise, and weight loss. Get a thorough checkup before you start any exercise program or change your diet.   [employer] last reviewed this educational content on 11/1/2020  © 1401-4709 [employer]. All rights reserved. This information is not intended as a substitute for professional medical care. Always follow your healthcare professional's instructions.          Working with a Healthcare Provider Who Specializes in Obesity  Some healthcare providers focus on treating people who are obese. They are called bariatric healthcare providers or bariatricians. Some may also be bariatric surgeons. These healthcare providers are trained to do surgery that aids in weight loss.  A general healthcare provider can offer some treatments for weight loss. But a bariatric healthcare provider has more training in how to treat obesity. These healthcare providers have had special training after medical school. Many of them have additional training to do weight loss surgery.  What is obesity?  Obesity is when body fat is above a certain level. Body mass  Statement Selected index (BMI) is a common way to measure obesity. BMI is a method of screening for a weight category using height and weight for calculation. A BMI of 25 to 29.9 means overweight. A BMI higher than 30 means that the patient is obese. Your healthcare provider can calculate your BMI for you. You can also ask your healthcare provider to teach you how to calculate BMI yourself.  Why see a healthcare provider who specializes in obesity?  If you are obese, it’s important to you get treatment. Obesity can lead to a number of serious health conditions, including:  Diabetes  Arthritis  High blood pressure  Heart disease  Stroke  Sleep apnea  Liver disease  Certain lung diseases  Certain cancers  You may begin your treatment with your primary healthcare provider. But if you need more help, you may want to see a bariatric healthcare provider. He or she may have new ideas or methods for weight loss that can help you. Some bariatric healthcare providers can also serve as a primary healthcare provider.  What to expect at your first visit  During your initial visit, your bariatric healthcare provider may:  Take a medical history, including your history of nutrition, exercise, and weight loss  Do a physical exam, including BMI, waist circumference, and blood pressure  Look at your health problems related to obesity  Look at you for other medical problems that might cause weight gain  Find out how ready you are to begin an exercise program  Find out if you need tests  Help you make realistic weight loss goals  Give you a nutrition plan  Tell you to keep a food diary  Find out if you need a weight-loss medicine  Your bariatric healthcare provider will also give you information about:  Healthy eating habits  Healthy exercise habits  How to change health behaviors  How mental health affects obesity  The complications of obesity  The benefits and risks of medicines  Creating a treatment plan for you  Your healthcare provider will create a  treatment plan for you based on your medical needs and preferences. At each follow-up visit, your healthcare provider will check your progress. He or she will make changes to your treatment as needed. If you aren’t losing enough weight, your healthcare provider will advise other changes. As you lose weight and your health improves, your healthcare provider might change some of your medicines.  Your bariatric healthcare provider may order some tests to check health factors related to obesity, such as:  Tests for diabetes, like fasting blood glucose  Lipid and cholesterol levels  Thyroid-stimulating hormone levels  Liver blood tests  Kidney function blood tests  Vitamin D levels  Electrocardiogram, to look at your heart rhythm  Exercise testing, to see how well your heart works during exercise  Resting metabolic rate, to look at how many calories you burn at rest  Your healthcare provider will also talk with you about your changing needs. For example, if your weight loss stops or you regain weight, he or she may talk with you about weight loss surgery.     How to find a healthcare provider  Talk with your primary healthcare provider. He or she may be able to refer you to a bariatric healthcare provider. The Obesity Medicine Association, formerly known as American Society of Bariatric Physicians, has an online listing of healthcare providers. You can search for a healthcare provider in your area.   Date Last Reviewed: 2/14/2016  © 6259-8919 [employer]. [address] [place] [address], [place] [address]. All rights reserved. This information is not intended as a substitute for professional medical care. Always follow your healthcare professional's instructions.

## 2023-11-16 NOTE — BRIEF OPERATIVE NOTE - NSICDXBRIEFPROCEDURE_GEN_ALL_CORE_FT
PROCEDURES:  Repair, hernia, ventral, using component separation technique 16-Nov-2023 21:45:06  Virgil Dotson

## 2023-11-16 NOTE — ASU DISCHARGE PLAN (ADULT/PEDIATRIC) - CARE PROVIDER_API CALL
Tabitha Coates [facility]  Surgery  [address] [place] [address], [address]  [place] [address]  Phone: [phone]  Follow Up Time: 1 week

## 2023-11-16 NOTE — BRIEF OPERATIVE NOTE - OPERATION/FINDINGS
Procedure: Diagnostic laparoscopy, robotic assisted ventral hernia repair converted to open ventral hernia repair with anterior and posterior component separation with retrorectus mesh    Optiview entry in LUQ. Peritoneal flap generated superior to inferior with friability of peritoneal flap. Converted to open procedure with semilunar umbilical incision. Retrorectus space generated and posterior fascia closed with PDS suture. Progrip mesh placed and secured with 2-0 vicryl suture. Anterior fascia closed with 2-0 PDS. Abdominal wall closed in layers. Diagnostic laparoscopy performed confirming full coverage of mesh and separation from peritoneal cavity.

## 2023-11-17 ENCOUNTER — TRANSCRIPTION ENCOUNTER (OUTPATIENT)
Age: 38
End: 2023-11-17

## 2023-11-17 VITALS
OXYGEN SATURATION: 99 % | SYSTOLIC BLOOD PRESSURE: 109 MMHG | RESPIRATION RATE: 18 BRPM | HEART RATE: 79 BPM | DIASTOLIC BLOOD PRESSURE: 68 MMHG | TEMPERATURE: 98 F

## 2023-11-17 LAB
ANION GAP SERPL CALC-SCNC: 11 MMOL/L — SIGNIFICANT CHANGE UP (ref 5–17)
ANION GAP SERPL CALC-SCNC: 11 MMOL/L — SIGNIFICANT CHANGE UP (ref 5–17)
BASOPHILS # BLD AUTO: 0.02 K/UL — SIGNIFICANT CHANGE UP (ref 0–0.2)
BASOPHILS # BLD AUTO: 0.02 K/UL — SIGNIFICANT CHANGE UP (ref 0–0.2)
BASOPHILS NFR BLD AUTO: 0.1 % — SIGNIFICANT CHANGE UP (ref 0–2)
BASOPHILS NFR BLD AUTO: 0.1 % — SIGNIFICANT CHANGE UP (ref 0–2)
BUN SERPL-MCNC: 7 MG/DL — SIGNIFICANT CHANGE UP (ref 7–23)
BUN SERPL-MCNC: 7 MG/DL — SIGNIFICANT CHANGE UP (ref 7–23)
CALCIUM SERPL-MCNC: 9.9 MG/DL — SIGNIFICANT CHANGE UP (ref 8.4–10.5)
CALCIUM SERPL-MCNC: 9.9 MG/DL — SIGNIFICANT CHANGE UP (ref 8.4–10.5)
CHLORIDE SERPL-SCNC: 106 MMOL/L — SIGNIFICANT CHANGE UP (ref 96–108)
CHLORIDE SERPL-SCNC: 106 MMOL/L — SIGNIFICANT CHANGE UP (ref 96–108)
CO2 SERPL-SCNC: 25 MMOL/L — SIGNIFICANT CHANGE UP (ref 22–31)
CO2 SERPL-SCNC: 25 MMOL/L — SIGNIFICANT CHANGE UP (ref 22–31)
CREAT SERPL-MCNC: 0.72 MG/DL — SIGNIFICANT CHANGE UP (ref 0.5–1.3)
CREAT SERPL-MCNC: 0.72 MG/DL — SIGNIFICANT CHANGE UP (ref 0.5–1.3)
EGFR: 108 ML/MIN/1.73M2 — SIGNIFICANT CHANGE UP
EGFR: 108 ML/MIN/1.73M2 — SIGNIFICANT CHANGE UP
EOSINOPHIL # BLD AUTO: 0.01 K/UL — SIGNIFICANT CHANGE UP (ref 0–0.5)
EOSINOPHIL # BLD AUTO: 0.01 K/UL — SIGNIFICANT CHANGE UP (ref 0–0.5)
EOSINOPHIL NFR BLD AUTO: 0.1 % — SIGNIFICANT CHANGE UP (ref 0–6)
EOSINOPHIL NFR BLD AUTO: 0.1 % — SIGNIFICANT CHANGE UP (ref 0–6)
GLUCOSE SERPL-MCNC: 119 MG/DL — HIGH (ref 70–99)
GLUCOSE SERPL-MCNC: 119 MG/DL — HIGH (ref 70–99)
HCT VFR BLD CALC: 41.2 % — SIGNIFICANT CHANGE UP (ref 34.5–45)
HCT VFR BLD CALC: 41.2 % — SIGNIFICANT CHANGE UP (ref 34.5–45)
HGB BLD-MCNC: 13.2 G/DL — SIGNIFICANT CHANGE UP (ref 11.5–15.5)
HGB BLD-MCNC: 13.2 G/DL — SIGNIFICANT CHANGE UP (ref 11.5–15.5)
IMM GRANULOCYTES NFR BLD AUTO: 0.3 % — SIGNIFICANT CHANGE UP (ref 0–0.9)
IMM GRANULOCYTES NFR BLD AUTO: 0.3 % — SIGNIFICANT CHANGE UP (ref 0–0.9)
LYMPHOCYTES # BLD AUTO: 0.61 K/UL — LOW (ref 1–3.3)
LYMPHOCYTES # BLD AUTO: 0.61 K/UL — LOW (ref 1–3.3)
LYMPHOCYTES # BLD AUTO: 4.4 % — LOW (ref 13–44)
LYMPHOCYTES # BLD AUTO: 4.4 % — LOW (ref 13–44)
MAGNESIUM SERPL-MCNC: 2 MG/DL — SIGNIFICANT CHANGE UP (ref 1.6–2.6)
MAGNESIUM SERPL-MCNC: 2 MG/DL — SIGNIFICANT CHANGE UP (ref 1.6–2.6)
MCHC RBC-ENTMCNC: 27.9 PG — SIGNIFICANT CHANGE UP (ref 27–34)
MCHC RBC-ENTMCNC: 27.9 PG — SIGNIFICANT CHANGE UP (ref 27–34)
MCHC RBC-ENTMCNC: 32 GM/DL — SIGNIFICANT CHANGE UP (ref 32–36)
MCHC RBC-ENTMCNC: 32 GM/DL — SIGNIFICANT CHANGE UP (ref 32–36)
MCV RBC AUTO: 87.1 FL — SIGNIFICANT CHANGE UP (ref 80–100)
MCV RBC AUTO: 87.1 FL — SIGNIFICANT CHANGE UP (ref 80–100)
MONOCYTES # BLD AUTO: 1.01 K/UL — HIGH (ref 0–0.9)
MONOCYTES # BLD AUTO: 1.01 K/UL — HIGH (ref 0–0.9)
MONOCYTES NFR BLD AUTO: 7.3 % — SIGNIFICANT CHANGE UP (ref 2–14)
MONOCYTES NFR BLD AUTO: 7.3 % — SIGNIFICANT CHANGE UP (ref 2–14)
NEUTROPHILS # BLD AUTO: 12.16 K/UL — HIGH (ref 1.8–7.4)
NEUTROPHILS # BLD AUTO: 12.16 K/UL — HIGH (ref 1.8–7.4)
NEUTROPHILS NFR BLD AUTO: 87.8 % — HIGH (ref 43–77)
NEUTROPHILS NFR BLD AUTO: 87.8 % — HIGH (ref 43–77)
NRBC # BLD: 0 /100 WBCS — SIGNIFICANT CHANGE UP (ref 0–0)
NRBC # BLD: 0 /100 WBCS — SIGNIFICANT CHANGE UP (ref 0–0)
PHOSPHATE SERPL-MCNC: 3.8 MG/DL — SIGNIFICANT CHANGE UP (ref 2.5–4.5)
PHOSPHATE SERPL-MCNC: 3.8 MG/DL — SIGNIFICANT CHANGE UP (ref 2.5–4.5)
PLATELET # BLD AUTO: 190 K/UL — SIGNIFICANT CHANGE UP (ref 150–400)
PLATELET # BLD AUTO: 190 K/UL — SIGNIFICANT CHANGE UP (ref 150–400)
POTASSIUM SERPL-MCNC: 4.3 MMOL/L — SIGNIFICANT CHANGE UP (ref 3.5–5.3)
POTASSIUM SERPL-MCNC: 4.3 MMOL/L — SIGNIFICANT CHANGE UP (ref 3.5–5.3)
POTASSIUM SERPL-SCNC: 4.3 MMOL/L — SIGNIFICANT CHANGE UP (ref 3.5–5.3)
POTASSIUM SERPL-SCNC: 4.3 MMOL/L — SIGNIFICANT CHANGE UP (ref 3.5–5.3)
RBC # BLD: 4.73 M/UL — SIGNIFICANT CHANGE UP (ref 3.8–5.2)
RBC # BLD: 4.73 M/UL — SIGNIFICANT CHANGE UP (ref 3.8–5.2)
RBC # FLD: 13.2 % — SIGNIFICANT CHANGE UP (ref 10.3–14.5)
RBC # FLD: 13.2 % — SIGNIFICANT CHANGE UP (ref 10.3–14.5)
SODIUM SERPL-SCNC: 142 MMOL/L — SIGNIFICANT CHANGE UP (ref 135–145)
SODIUM SERPL-SCNC: 142 MMOL/L — SIGNIFICANT CHANGE UP (ref 135–145)
WBC # BLD: 13.85 K/UL — HIGH (ref 3.8–10.5)
WBC # BLD: 13.85 K/UL — HIGH (ref 3.8–10.5)
WBC # FLD AUTO: 13.85 K/UL — HIGH (ref 3.8–10.5)
WBC # FLD AUTO: 13.85 K/UL — HIGH (ref 3.8–10.5)

## 2023-11-17 PROCEDURE — 80048 BASIC METABOLIC PNL TOTAL CA: CPT

## 2023-11-17 PROCEDURE — C1781: CPT

## 2023-11-17 PROCEDURE — 84100 ASSAY OF PHOSPHORUS: CPT

## 2023-11-17 PROCEDURE — 83735 ASSAY OF MAGNESIUM: CPT

## 2023-11-17 PROCEDURE — 49594 RPR AA HRN 1ST 3-10 NCR/STRN: CPT

## 2023-11-17 PROCEDURE — 85025 COMPLETE CBC W/AUTO DIFF WBC: CPT

## 2023-11-17 PROCEDURE — S2900: CPT

## 2023-11-17 PROCEDURE — 36415 COLL VENOUS BLD VENIPUNCTURE: CPT

## 2023-11-17 RX ORDER — DOCUSATE SODIUM 100 MG
1 CAPSULE ORAL
Qty: 14 | Refills: 0
Start: 2023-11-17 | End: 2023-11-23

## 2023-11-17 RX ORDER — OXYCODONE HYDROCHLORIDE 5 MG/1
1 TABLET ORAL
Qty: 12 | Refills: 0
Start: 2023-11-17 | End: 2023-11-19

## 2023-11-17 RX ADMIN — Medication 1 DROP(S): at 03:39

## 2023-11-17 RX ADMIN — Medication 1 DROP(S): at 09:15

## 2023-11-17 RX ADMIN — OXYCODONE HYDROCHLORIDE 5 MILLIGRAM(S): 5 TABLET ORAL at 00:27

## 2023-11-17 RX ADMIN — HEPARIN SODIUM 5000 UNIT(S): 5000 INJECTION INTRAVENOUS; SUBCUTANEOUS at 06:11

## 2023-11-17 RX ADMIN — OXYCODONE HYDROCHLORIDE 5 MILLIGRAM(S): 5 TABLET ORAL at 01:00

## 2023-11-17 NOTE — PROGRESS NOTE ADULT - SUBJECTIVE AND OBJECTIVE BOX
INTERVAL HPI/OVERNIGHT EVENTS: POC wnl. Passed TOV. C/o R eye dryness. IVHL    STATUS POST:  11/16: RA converted to open, ventral hernia repair with component separation and retrorectus mesh placement    POST OPERATIVE DAY #: 1    SUBJECTIVE: Pt seen and examined at bedside this am by surgery team. No acute complaints. -F/-BM. Tolerating diet, pain well controlled. Denies f/n/v/cp/sob.    MEDICATIONS  (STANDING):  heparin   Injectable 5000 Unit(s) SubCutaneous every 8 hours    MEDICATIONS  (PRN):  acetaminophen     Tablet .. 650 milliGRAM(s) Oral every 6 hours PRN Mild Pain (1 - 3)  acetaminophen   IVPB .. 1000 milliGRAM(s) IV Intermittent once PRN Temp greater or equal to 38C (100.4F), Mild Pain (1 - 3)  artificial tears (preservative free) Ophthalmic Solution 1 Drop(s) Right EYE every 4 hours PRN Dry Eyes  ondansetron Injectable 4 milliGRAM(s) IV Push every 6 hours PRN Nausea and/or Vomiting  oxyCODONE    IR 5 milliGRAM(s) Oral every 4 hours PRN Moderate Pain (4 - 6)      Vital Signs Last 24 Hrs  T(C): 36.7 (17 Nov 2023 04:30), Max: 36.9 (16 Nov 2023 23:54)  T(F): 98.1 (17 Nov 2023 04:30), Max: 98.5 (16 Nov 2023 23:54)  HR: 80 (17 Nov 2023 04:30) (71 - 116)  BP: 118/72 (17 Nov 2023 04:30) (111/59 - 131/81)  BP(mean): 78 (16 Nov 2023 23:03) (78 - 90)  RR: 17 (17 Nov 2023 04:30) (6 - 23)  SpO2: 98% (17 Nov 2023 04:30) (96% - 100%)    Parameters below as of 17 Nov 2023 04:30  Patient On (Oxygen Delivery Method): room air    PHYSICAL EXAM:    Constitutional: A&Ox3, NAD    Respiratory: non labored breathing, no respiratory distress    Cardiovascular: NSR, RRR    Gastrointestinal: abdominal binder in place, abdomen soft, nd, appropriately ttp to incisions. Dressings c/d/i. No R/G.    Extremities: wwp, no calf tenderness or edema. SCDs in place       I&O's Detail    16 Nov 2023 07:01  -  17 Nov 2023 07:00  --------------------------------------------------------  IN:    Lactated Ringers: 400 mL  Total IN: 400 mL    OUT:    Voided (mL): 1200 mL  Total OUT: 1200 mL    Total NET: -800 mL      17 Nov 2023 07:01  -  17 Nov 2023 09:50  --------------------------------------------------------  IN:  Total IN: 0 mL    OUT:    Voided (mL): 700 mL  Total OUT: 700 mL    Total NET: -700 mL          LABS:                        13.2   13.85 )-----------( 190      ( 17 Nov 2023 05:30 )             41.2     11-17    142  |  106  |  7   ----------------------------<  119<H>  4.3   |  25  |  0.72    Ca    9.9      17 Nov 2023 05:30  Phos  3.8     11-17  Mg     2.0     11-17        Urinalysis Basic - ( 17 Nov 2023 05:30 )    Color: x / Appearance: x / SG: x / pH: x  Gluc: 119 mg/dL / Ketone: x  / Bili: x / Urobili: x   Blood: x / Protein: x / Nitrite: x   Leuk Esterase: x / RBC: x / WBC x   Sq Epi: x / Non Sq Epi: x / Bacteria: x        RADIOLOGY & ADDITIONAL STUDIES:
Procedure: Robotic assisted, open ventral hernia repair  Surgeon: Dr. harrison    S: Pt c/o of pain near blood pressure cuff and IV site. No other acute complaints. No attempt for PO intake, -n/-v/-f/-bm.. Denies CP, SOB, BETTENCOURT, calf tenderness.    O:  T(C): 36.9 (11-16-23 @ 23:54), Max: 36.9 (11-16-23 @ 23:54)  T(F): 98.5 (11-16-23 @ 23:54), Max: 98.5 (11-16-23 @ 23:54)  HR: 100 (11-16-23 @ 23:54) (96 - 116)  BP: 111/69 (11-16-23 @ 23:54) (111/59 - 127/63)  RR: 17 (11-16-23 @ 23:54) (6 - 23)  SpO2: 96% (11-16-23 @ 23:54) (96% - 100%)  Wt(kg): --            Gen: NAD, resting comfortably in bed  C/V: NSR  Pulm: Nonlabored breathing, no respiratory distress  Abd: soft, NT/ND. No rebound or guarding   Incision: C/d/I  Extrem: WWP, no calf edema, SCDs in place

## 2023-11-17 NOTE — DISCHARGE NOTE NURSING/CASE MANAGEMENT/SOCIAL WORK - NSDCVIVACCINE_GEN_ALL_CORE_FT
Tdap; 04-May-2017 20:16; Rebeka Mccauley (TOSHIA); [employer]; [id number]; IntraMuscular; Dorsogluteal Right.; 0.5 milliLiter(s); VIS (VIS Published: 09-May-2013, VIS Presented: 04-May-2017);

## 2023-11-17 NOTE — DISCHARGE NOTE NURSING/CASE MANAGEMENT/SOCIAL WORK - PATIENT PORTAL LINK FT
You can access the FollowMyHealth Patient Portal offered by [facility] by registering at the following website: [email or web address]. By joining [employer]’s FollowMyHealth portal, you will also be able to view your health information using other applications (apps) compatible with our system.

## 2023-11-17 NOTE — PROGRESS NOTE ADULT - ASSESSMENT
39yo female with PMH of preecclampsia, GERD, endometrial polyps, uterine fibroids and PSH of c/s now s/p RA converted to open ventral hernia repair with component separation and retrorectus mesh placement on 11/16.     Admit to regional, 23 hr obs   Regular diet   Pain/nausea control prn  HSQ/SCDs  OOBA/IS  AM labs   Dc home today if tolerating PO
39yo female with PMH of preecclampsia, GERD, endometrial polyps, uterine fibroids and PSH of c/s now s/p RA converted to open ventral hernia repair with mesh. POC WNL    Regional  SQH 6hr  Reg diet/ivf  tov pending  am labs  23hr obs

## 2023-11-22 ENCOUNTER — NON-APPOINTMENT (OUTPATIENT)
Age: 38
End: 2023-11-22

## 2023-11-22 LAB
HSV 1+2 IGG SER IA-IMP: POSITIVE
HSV 1+2 IGG SER IA-IMP: POSITIVE
HSV1 IGG SER QL: 12.9 INDEX
HSV2 IGG SER QL: 5.71 INDEX

## 2023-12-20 ENCOUNTER — APPOINTMENT (OUTPATIENT)
Dept: HEMATOLOGY ONCOLOGY | Facility: CLINIC | Age: 38
End: 2023-12-20

## 2023-12-27 NOTE — PHYSICAL EXAM
[Chaperone Present] : A chaperone was present in the examining room during all aspects of the physical examination [Appropriately responsive] : appropriately responsive [Alert] : alert [No Acute Distress] : no acute distress [Soft] : soft [Non-tender] : non-tender [Non-distended] : non-distended [No HSM] : No HSM [No Lesions] : no lesions [No Mass] : no mass [Oriented x3] : oriented x3 [Examination Of The Breasts] : a normal appearance [Breast Palpation Diffuse Fibrous Tissue Bilateral] : fibrocystic changes [No Masses] : no breast masses were palpable [Labia Majora] : normal [Labia Minora] : normal [Normal] : normal [Tenderness] : nontender [Enlarged ___ wks] : enlarged [unfilled] ~Uweeks [Uterine Adnexae] : normal

## 2024-01-19 ENCOUNTER — APPOINTMENT (OUTPATIENT)
Dept: OBGYN | Facility: CLINIC | Age: 39
End: 2024-01-19
Payer: COMMERCIAL

## 2024-01-19 VITALS
HEART RATE: 73 BPM | OXYGEN SATURATION: 100 % | HEIGHT: 63 IN | DIASTOLIC BLOOD PRESSURE: 79 MMHG | BODY MASS INDEX: 25.69 KG/M2 | WEIGHT: 145 LBS | SYSTOLIC BLOOD PRESSURE: 120 MMHG

## 2024-01-19 DIAGNOSIS — N76.0 ACUTE VAGINITIS: ICD-10-CM

## 2024-01-19 PROCEDURE — 99213 OFFICE O/P EST LOW 20 MIN: CPT

## 2024-01-19 RX ORDER — FLUCONAZOLE 150 MG/1
150 TABLET ORAL
Qty: 1 | Refills: 1 | Status: ACTIVE | COMMUNITY
Start: 2024-01-19 | End: 1900-01-01

## 2024-01-24 LAB
A VAGINAE DNA VAG QL NAA+PROBE: NORMAL
BVAB2 DNA VAG QL NAA+PROBE: NORMAL
C KRUSEI DNA VAG QL NAA+PROBE: NEGATIVE
C KRUSEI DNA VAG QL NAA+PROBE: POSITIVE
C TRACH RRNA SPEC QL NAA+PROBE: NEGATIVE
CANDIDA DNA VAG QL NAA+PROBE: NEGATIVE
MEGA1 DNA VAG QL NAA+PROBE: NORMAL
N GONORRHOEA RRNA SPEC QL NAA+PROBE: NEGATIVE
T VAGINALIS RRNA SPEC QL NAA+PROBE: NEGATIVE

## 2024-01-30 PROBLEM — Z98.890 S/P MYOMECTOMY: Status: ACTIVE | Noted: 2023-05-03

## 2024-01-30 PROBLEM — K42.9 UMBILICAL HERNIA: Status: ACTIVE | Noted: 2023-07-21

## 2024-01-30 NOTE — PHYSICAL EXAM
[Soft] : soft [Non-tender] : non-tender [FreeTextEntry7] : reducible umbilical hernia [Labia Majora] : normal [Labia Minora] : normal [Normal] : normal [Anteversion] : anteverted [Uterine Adnexae] : normal

## 2024-01-30 NOTE — DISCUSSION/SUMMARY
[FreeTextEntry1] : 39 yo patient presents for follow up myomectomy -umbilical hernia post surgery planned for repair myoma f/u: ultrasound: slightly enlarged uterus, no fibroids , normal ovaries. -ultrasound results reviewed -age related fertility discussed -resume regular gynecological care

## 2024-01-30 NOTE — HISTORY OF PRESENT ILLNESS
[FreeTextEntry1] : 39 yo patient presents for follow up from myomectomy 3/13/23. Patient has noticed after surgery sexual intercourse is painful and when she urinates, she feels discomfort in abdominal pelvic area. She is scheduled for umbilical hernia repair on 11/21/23.  She has no plans on pregnancy at the moment.

## 2024-01-30 NOTE — PROCEDURE
[FreeTextEntry9] : f/u post myomectomy [FreeTextEntry3] : endometrium linin.6 mm  [FreeTextEntry7] : 3.5 x 2.3 x 3.9 cm  [FreeTextEntry8] : 3.3 x 2.3 x 3.2 cm  [FreeTextEntry4] : slightly enlarged uterus, no fibroids , normal ovaries.

## 2024-02-04 NOTE — PLAN
[FreeTextEntry1] : Ms. Wagoner presents for evaluation of vaginitis. - Vitals reviewed and within normal limits.  -  Pelvic exam performed today. Findings consistent with yeast vaginitis. - Will treat with diflucan.  - Vaginitis plus panel pending.   Plan of care discussed with patient who has no additional questions and verbalized agreement.

## 2024-02-04 NOTE — HISTORY OF PRESENT ILLNESS
[FreeTextEntry1] : 39 yo present for problem visit today. Pt c/o vaginitis: external vaginal itching, vaginal discomfort, clear to white discharge and slight vaginal odor. Pt stated that she tried Monitstat 3 a week ago with some improvement initially but now the symptoms have returned.   Patient denies new sexual partners, changes to her diet, new medications or any other potentially triggering factors.   Patient denies abnormal vaginal bleeding, dysuria, changes to her bowel habits, incontinence or any other GYN symptoms.

## 2024-02-04 NOTE — PHYSICAL EXAM
[Chaperone Present] : A chaperone was present in the examining room during all aspects of the physical examination [Appropriately responsive] : appropriately responsive [Alert] : alert [No Acute Distress] : no acute distress [Labia Majora] : normal [Labia Minora] : normal [Discharge] : a  ~M vaginal discharge was present [Moderate] : moderate [White] : white [Thick] : thick [No Bleeding] : There was no active vaginal bleeding [Normal] : normal [Uterine Adnexae] : non-palpable [FreeTextEntry8] : Nontender, no CMT, no adnexal masses or fullness appreciated.

## 2024-02-13 ENCOUNTER — NON-APPOINTMENT (OUTPATIENT)
Age: 39
End: 2024-02-13

## 2024-02-15 RX ORDER — FLUCONAZOLE 200 MG/1
200 TABLET ORAL
Qty: 2 | Refills: 1 | Status: ACTIVE | COMMUNITY
Start: 2024-02-15 | End: 1900-01-01

## 2024-02-23 ENCOUNTER — NON-APPOINTMENT (OUTPATIENT)
Age: 39
End: 2024-02-23

## 2024-08-21 ENCOUNTER — APPOINTMENT (OUTPATIENT)
Dept: OBGYN | Facility: CLINIC | Age: 39
End: 2024-08-21

## 2024-09-13 ENCOUNTER — NON-APPOINTMENT (OUTPATIENT)
Age: 39
End: 2024-09-13

## 2024-10-23 ENCOUNTER — NON-APPOINTMENT (OUTPATIENT)
Age: 39
End: 2024-10-23

## 2024-10-23 ENCOUNTER — APPOINTMENT (OUTPATIENT)
Dept: OBGYN | Facility: CLINIC | Age: 39
End: 2024-10-23
Payer: COMMERCIAL

## 2024-10-23 VITALS
SYSTOLIC BLOOD PRESSURE: 124 MMHG | HEART RATE: 79 BPM | DIASTOLIC BLOOD PRESSURE: 81 MMHG | OXYGEN SATURATION: 98 % | BODY MASS INDEX: 25.52 KG/M2 | HEIGHT: 63 IN | WEIGHT: 144 LBS

## 2024-10-23 DIAGNOSIS — D21.9 BENIGN NEOPLASM OF CONNECTIVE AND OTHER SOFT TISSUE, UNSPECIFIED: ICD-10-CM

## 2024-10-23 DIAGNOSIS — Z98.890 OTHER SPECIFIED POSTPROCEDURAL STATES: ICD-10-CM

## 2024-10-23 DIAGNOSIS — Z01.419 ENCOUNTER FOR GYNECOLOGICAL EXAMINATION (GENERAL) (ROUTINE) W/OUT ABNORMAL FINDINGS: ICD-10-CM

## 2024-10-23 DIAGNOSIS — Z31.9 ENCOUNTER FOR PROCREATIVE MANAGEMENT, UNSPECIFIED: ICD-10-CM

## 2024-10-23 PROCEDURE — 99396 PREV VISIT EST AGE 40-64: CPT

## 2024-10-23 PROCEDURE — 99459 PELVIC EXAMINATION: CPT

## 2024-10-23 PROCEDURE — 36415 COLL VENOUS BLD VENIPUNCTURE: CPT

## 2024-10-25 ENCOUNTER — NON-APPOINTMENT (OUTPATIENT)
Age: 39
End: 2024-10-25

## 2024-10-25 LAB
ANTI-MUELLERIAN HORMONE: 2.42 NG/ML
BV BACTERIA RRNA VAG QL NAA+PROBE: NOT DETECTED
C GLABRATA RNA VAG QL NAA+PROBE: NOT DETECTED
C TRACH RRNA SPEC QL NAA+PROBE: NOT DETECTED
CANDIDA RRNA VAG QL PROBE: DETECTED
HPV 16 E6+E7 MRNA CVX QL NAA+PROBE: NOT DETECTED
HPV HIGH+LOW RISK DNA PNL CVX: NOT DETECTED
HPV18+45 E6+E7 MRNA CVX QL NAA+PROBE: NOT DETECTED
N GONORRHOEA RRNA SPEC QL NAA+PROBE: NOT DETECTED
T VAGINALIS RRNA SPEC QL NAA+PROBE: NOT DETECTED

## 2024-10-28 LAB — CYTOLOGY CVX/VAG DOC THIN PREP: ABNORMAL

## 2024-10-28 RX ORDER — FLUCONAZOLE 150 MG/1
150 TABLET ORAL
Qty: 2 | Refills: 0 | Status: ACTIVE | COMMUNITY
Start: 2024-10-28 | End: 1900-01-01

## 2024-10-31 ENCOUNTER — APPOINTMENT (OUTPATIENT)
Dept: OBGYN | Facility: CLINIC | Age: 39
End: 2024-10-31

## 2024-11-15 ENCOUNTER — APPOINTMENT (OUTPATIENT)
Dept: OBGYN | Facility: CLINIC | Age: 39
End: 2024-11-15

## 2024-11-25 ENCOUNTER — APPOINTMENT (OUTPATIENT)
Dept: UROGYNECOLOGY | Facility: CLINIC | Age: 39
End: 2024-11-25
Payer: COMMERCIAL

## 2024-11-25 ENCOUNTER — NON-APPOINTMENT (OUTPATIENT)
Age: 39
End: 2024-11-25

## 2024-11-25 VITALS
OXYGEN SATURATION: 98 % | DIASTOLIC BLOOD PRESSURE: 77 MMHG | WEIGHT: 140 LBS | SYSTOLIC BLOOD PRESSURE: 118 MMHG | BODY MASS INDEX: 24.8 KG/M2 | HEART RATE: 81 BPM

## 2024-11-25 DIAGNOSIS — Z12.4 ENCOUNTER FOR SCREENING FOR MALIGNANT NEOPLASM OF CERVIX: ICD-10-CM

## 2024-11-25 DIAGNOSIS — N76.0 ACUTE VAGINITIS: ICD-10-CM

## 2024-11-25 PROCEDURE — 99459 PELVIC EXAMINATION: CPT

## 2024-11-25 PROCEDURE — 99213 OFFICE O/P EST LOW 20 MIN: CPT

## 2024-11-27 LAB
BV BACTERIA RRNA VAG QL NAA+PROBE: DETECTED
C GLABRATA RNA VAG QL NAA+PROBE: NOT DETECTED
C TRACH RRNA SPEC QL NAA+PROBE: NOT DETECTED
CANDIDA RRNA VAG QL PROBE: DETECTED
N GONORRHOEA RRNA SPEC QL NAA+PROBE: NOT DETECTED
T VAGINALIS RRNA SPEC QL NAA+PROBE: NOT DETECTED

## 2024-12-02 ENCOUNTER — NON-APPOINTMENT (OUTPATIENT)
Age: 39
End: 2024-12-02

## 2024-12-02 ENCOUNTER — TRANSCRIPTION ENCOUNTER (OUTPATIENT)
Age: 39
End: 2024-12-02

## 2024-12-02 LAB — CYTOLOGY CVX/VAG DOC THIN PREP: ABNORMAL

## 2024-12-08 RX ORDER — METRONIDAZOLE 7.5 MG/G
0.75 GEL VAGINAL
Qty: 1 | Refills: 0 | Status: ACTIVE | COMMUNITY
Start: 2024-11-27 | End: 1900-01-01

## 2024-12-08 RX ORDER — FLUCONAZOLE 150 MG/1
150 TABLET ORAL
Qty: 2 | Refills: 0 | Status: ACTIVE | COMMUNITY
Start: 2024-11-27 | End: 1900-01-01

## 2025-03-19 ENCOUNTER — APPOINTMENT (OUTPATIENT)
Dept: HUMAN REPRODUCTION | Facility: CLINIC | Age: 40
End: 2025-03-19

## 2025-03-19 ENCOUNTER — APPOINTMENT (OUTPATIENT)
Dept: OBGYN | Facility: CLINIC | Age: 40
End: 2025-03-19

## 2025-03-19 ENCOUNTER — APPOINTMENT (OUTPATIENT)
Dept: UROGYNECOLOGY | Facility: CLINIC | Age: 40
End: 2025-03-19
Payer: COMMERCIAL

## 2025-03-19 VITALS
WEIGHT: 145 LBS | SYSTOLIC BLOOD PRESSURE: 129 MMHG | DIASTOLIC BLOOD PRESSURE: 81 MMHG | HEART RATE: 67 BPM | OXYGEN SATURATION: 100 % | BODY MASS INDEX: 25.69 KG/M2

## 2025-03-19 DIAGNOSIS — N76.0 ACUTE VAGINITIS: ICD-10-CM

## 2025-03-19 PROCEDURE — 36415 COLL VENOUS BLD VENIPUNCTURE: CPT

## 2025-03-19 PROCEDURE — 99213 OFFICE O/P EST LOW 20 MIN: CPT

## 2025-03-19 PROCEDURE — 76830 TRANSVAGINAL US NON-OB: CPT

## 2025-03-19 PROCEDURE — 99459 PELVIC EXAMINATION: CPT

## 2025-03-19 PROCEDURE — 99203 OFFICE O/P NEW LOW 30 MIN: CPT | Mod: 25

## 2025-03-20 ENCOUNTER — APPOINTMENT (OUTPATIENT)
Dept: OBGYN | Facility: CLINIC | Age: 40
End: 2025-03-20

## 2025-03-20 ENCOUNTER — NON-APPOINTMENT (OUTPATIENT)
Age: 40
End: 2025-03-20

## 2025-03-26 ENCOUNTER — NON-APPOINTMENT (OUTPATIENT)
Age: 40
End: 2025-03-26

## 2025-04-17 ENCOUNTER — APPOINTMENT (OUTPATIENT)
Dept: HUMAN REPRODUCTION | Facility: CLINIC | Age: 40
End: 2025-04-17

## 2025-04-17 PROCEDURE — 83001 ASSAY OF GONADOTROPIN (FSH): CPT | Mod: QW

## 2025-04-17 PROCEDURE — 83002 ASSAY OF GONADOTROPIN (LH): CPT | Mod: QW

## 2025-04-17 PROCEDURE — 36415 COLL VENOUS BLD VENIPUNCTURE: CPT

## 2025-04-17 PROCEDURE — 82670 ASSAY OF TOTAL ESTRADIOL: CPT

## 2025-04-17 PROCEDURE — 84702 CHORIONIC GONADOTROPIN TEST: CPT

## 2025-04-17 PROCEDURE — 84144 ASSAY OF PROGESTERONE: CPT

## 2025-04-17 PROCEDURE — 36415 COLL VENOUS BLD VENIPUNCTURE: CPT | Mod: 59

## 2025-04-22 ENCOUNTER — APPOINTMENT (OUTPATIENT)
Dept: HUMAN REPRODUCTION | Facility: CLINIC | Age: 40
End: 2025-04-22

## 2025-04-23 ENCOUNTER — APPOINTMENT (OUTPATIENT)
Dept: HUMAN REPRODUCTION | Facility: CLINIC | Age: 40
End: 2025-04-23

## 2025-05-23 ENCOUNTER — APPOINTMENT (OUTPATIENT)
Dept: HUMAN REPRODUCTION | Facility: CLINIC | Age: 40
End: 2025-05-23

## 2025-05-23 ENCOUNTER — APPOINTMENT (OUTPATIENT)
Dept: OBGYN | Facility: CLINIC | Age: 40
End: 2025-05-23

## 2025-05-23 VITALS
SYSTOLIC BLOOD PRESSURE: 123 MMHG | WEIGHT: 147 LBS | DIASTOLIC BLOOD PRESSURE: 71 MMHG | HEART RATE: 80 BPM | OXYGEN SATURATION: 100 % | BODY MASS INDEX: 26.04 KG/M2

## 2025-05-23 DIAGNOSIS — N89.8 OTHER SPECIFIED NONINFLAMMATORY DISORDERS OF VAGINA: ICD-10-CM

## 2025-05-23 PROCEDURE — 58999I: CUSTOM

## 2025-05-23 PROCEDURE — 99459 PELVIC EXAMINATION: CPT

## 2025-05-23 PROCEDURE — 58340 CATHETER FOR HYSTEROGRAPHY: CPT

## 2025-05-23 PROCEDURE — 76831 ECHO EXAM UTERUS: CPT

## 2025-05-23 PROCEDURE — 99213 OFFICE O/P EST LOW 20 MIN: CPT

## 2025-05-23 RX ORDER — TERCONAZOLE 8 MG/G
0.8 CREAM VAGINAL
Qty: 1 | Refills: 1 | Status: ACTIVE | COMMUNITY
Start: 2025-05-23 | End: 1900-01-01

## 2025-05-23 RX ORDER — SECNIDAZOLE 2 G/4.8G
2 GRANULE ORAL ONCE
Qty: 1 | Refills: 1 | Status: ACTIVE | COMMUNITY
Start: 2025-05-23 | End: 1900-01-01

## 2025-05-27 ENCOUNTER — NON-APPOINTMENT (OUTPATIENT)
Age: 40
End: 2025-05-27

## 2025-05-28 LAB
A VAGINAE DNA VAG QL NAA+PROBE: NORMAL
BVAB2 DNA VAG QL NAA+PROBE: ABNORMAL
C KRUSEI DNA VAG QL NAA+PROBE: NEGATIVE
C TRACH RRNA SPEC QL NAA+PROBE: NEGATIVE
CANDIDA DNA VAG QL NAA+PROBE: NEGATIVE
MEGA1 DNA VAG QL NAA+PROBE: NORMAL
N GONORRHOEA RRNA SPEC QL NAA+PROBE: NEGATIVE
T VAGINALIS RRNA SPEC QL NAA+PROBE: NEGATIVE

## 2025-06-05 ENCOUNTER — TRANSCRIPTION ENCOUNTER (OUTPATIENT)
Age: 40
End: 2025-06-05

## 2025-06-05 ENCOUNTER — NON-APPOINTMENT (OUTPATIENT)
Age: 40
End: 2025-06-05

## 2025-06-13 ENCOUNTER — NON-APPOINTMENT (OUTPATIENT)
Age: 40
End: 2025-06-13

## 2025-07-18 ENCOUNTER — TRANSCRIPTION ENCOUNTER (OUTPATIENT)
Age: 40
End: 2025-07-18

## 2025-07-23 ENCOUNTER — APPOINTMENT (OUTPATIENT)
Dept: OBGYN | Facility: CLINIC | Age: 40
End: 2025-07-23

## 2025-07-23 VITALS
HEIGHT: 63 IN | DIASTOLIC BLOOD PRESSURE: 68 MMHG | SYSTOLIC BLOOD PRESSURE: 106 MMHG | BODY MASS INDEX: 26.05 KG/M2 | WEIGHT: 147 LBS | OXYGEN SATURATION: 100 % | HEART RATE: 76 BPM

## 2025-07-23 DIAGNOSIS — Z98.890 OTHER SPECIFIED POSTPROCEDURAL STATES: ICD-10-CM

## 2025-07-23 DIAGNOSIS — N89.8 OTHER SPECIFIED NONINFLAMMATORY DISORDERS OF VAGINA: ICD-10-CM

## 2025-07-23 DIAGNOSIS — D21.9 BENIGN NEOPLASM OF CONNECTIVE AND OTHER SOFT TISSUE, UNSPECIFIED: ICD-10-CM

## 2025-07-23 PROCEDURE — 99213 OFFICE O/P EST LOW 20 MIN: CPT

## 2025-07-23 PROCEDURE — 99459 PELVIC EXAMINATION: CPT

## 2025-07-30 RX ORDER — METRONIDAZOLE 500 MG/1
500 TABLET ORAL TWICE DAILY
Qty: 14 | Refills: 0 | Status: ACTIVE | COMMUNITY
Start: 2025-07-30 | End: 1900-01-01

## 2025-07-31 ENCOUNTER — NON-APPOINTMENT (OUTPATIENT)
Age: 40
End: 2025-07-31

## 2025-07-31 LAB
A VAGINAE DNA VAG QL NAA+PROBE: ABNORMAL
BVAB2 DNA VAG QL NAA+PROBE: ABNORMAL
C KRUSEI DNA VAG QL NAA+PROBE: NEGATIVE
C TRACH RRNA SPEC QL NAA+PROBE: NEGATIVE
CANDIDA DNA VAG QL NAA+PROBE: NEGATIVE
MEGA1 DNA VAG QL NAA+PROBE: NORMAL
N GONORRHOEA RRNA SPEC QL NAA+PROBE: NEGATIVE
T VAGINALIS RRNA SPEC QL NAA+PROBE: NEGATIVE

## 2025-07-31 RX ORDER — METRONIDAZOLE 7.5 MG/G
0.75 GEL VAGINAL
Qty: 1 | Refills: 3 | Status: ACTIVE | COMMUNITY
Start: 2025-07-31 | End: 1900-01-01

## (undated) DEVICE — GOWN XL

## (undated) DEVICE — XI ARM SCISSOR MONO CURVED

## (undated) DEVICE — POSITIONER FOAM EGG CRATE ULNAR 2PCS (PINK)

## (undated) DEVICE — TROCAR GELPOINT MINI ADVANCED

## (undated) DEVICE — NDL GRASPING DISP

## (undated) DEVICE — ABDOMINAL BINDER MED/LG 9" X 36"-64"

## (undated) DEVICE — WARMING BLANKET UPPER ADULT

## (undated) DEVICE — ENDOCATCH II 15MM

## (undated) DEVICE — Device

## (undated) DEVICE — DRAPE INSTRUMENT POUCH 10" X 18"

## (undated) DEVICE — TIP METZENBAUM SCISSOR MONOPOLAR ENDOCUT (ORANGE)

## (undated) DEVICE — VENODYNE/SCD SLEEVE CALF MEDIUM

## (undated) DEVICE — SUT VICRYL PLUS 2-0 27" SH UNDYED

## (undated) DEVICE — GLV 8 PROTEXIS (WHITE)

## (undated) DEVICE — TROCAR COVIDIEN VERSAPORT BLADELESS OPTICAL 12MM STANDARD

## (undated) DEVICE — XI ARM NEEDLE DRIVER MEGA

## (undated) DEVICE — XI OBTURATOR OPTICAL BLADELESS 8MM

## (undated) DEVICE — MARKING PEN W RULER

## (undated) DEVICE — FOLEY TRAY 16FR 5CC LF UMETER CLOSED

## (undated) DEVICE — SUT VLOC 180 2-0 9" GS-22 GREEN

## (undated) DEVICE — XI DRAPE ARM

## (undated) DEVICE — SUT PDS PLUS 2-0 27" SH

## (undated) DEVICE — D HELP - CLEARVIEW CLEARIFY SYSTEM

## (undated) DEVICE — SUT VLOC 180 0 12" GS-21 GREEN

## (undated) DEVICE — SUT MONOCRYL 4-0 18" PS-2

## (undated) DEVICE — SUT PDO 2-0 1/2 CIRCLE 17MM NDL 20CM

## (undated) DEVICE — XI ARM FORCEP MARYLAND BIPOLAR

## (undated) DEVICE — SUT PDO 2-0 1/2 CIRCLE 26MM NDL 20CM

## (undated) DEVICE — DRAPE TOWEL BLUE 17" X 24"

## (undated) DEVICE — TUBING STRYKER PNEUMOCLEAR SMOKE EVACUATION HIGH FLOW

## (undated) DEVICE — APPLICATOR SURGICEL LAP TROCAR POINT 2.5MM X 150MM

## (undated) DEVICE — SUT NOVAFIL 0 30" T-19

## (undated) DEVICE — SUT MONOCRYL 4-0 27" PS-2 UNDYED

## (undated) DEVICE — SUT VICRYL 2-0 27" SH

## (undated) DEVICE — SUT VICRYL 3-0 27" SH UNDYED

## (undated) DEVICE — XI TIP COVER

## (undated) DEVICE — SUT VICRYL 0 27" UR-6

## (undated) DEVICE — GLV 7 PROTEXIS (WHITE)

## (undated) DEVICE — PACK GENERAL LAPAROSCOPY

## (undated) DEVICE — INZII RETRIEVAL SYSTEM 12/15MM

## (undated) DEVICE — SLV COMPRESSION CALF LG CTC

## (undated) DEVICE — POSITIONER PINK PAD PIGAZZI SYSTEM XL W ARM PROTECTOR

## (undated) DEVICE — PACK GENERAL MINOR

## (undated) DEVICE — XI DRAPE COLUMN

## (undated) DEVICE — XI SEAL UNIV 5- 8 MM